# Patient Record
Sex: FEMALE | Race: WHITE | NOT HISPANIC OR LATINO | Employment: FULL TIME | URBAN - METROPOLITAN AREA
[De-identification: names, ages, dates, MRNs, and addresses within clinical notes are randomized per-mention and may not be internally consistent; named-entity substitution may affect disease eponyms.]

---

## 2017-01-11 ENCOUNTER — OFFICE VISIT (OUTPATIENT)
Dept: OBSTETRICS AND GYNECOLOGY | Facility: CLINIC | Age: 47
End: 2017-01-11

## 2017-01-11 VITALS
HEIGHT: 63 IN | DIASTOLIC BLOOD PRESSURE: 70 MMHG | WEIGHT: 175 LBS | SYSTOLIC BLOOD PRESSURE: 110 MMHG | RESPIRATION RATE: 14 BRPM | BODY MASS INDEX: 31.01 KG/M2

## 2017-01-11 DIAGNOSIS — Z01.419 WELL FEMALE EXAM WITH ROUTINE GYNECOLOGICAL EXAM: Primary | ICD-10-CM

## 2017-01-11 PROCEDURE — 99396 PREV VISIT EST AGE 40-64: CPT | Performed by: OBSTETRICS & GYNECOLOGY

## 2017-01-11 NOTE — PROGRESS NOTES
"Chief Complaint: Needs annual exam    Tiffanie Gomez is a 46 y.o.  Ab1 LMP 17 presenting for annual exam.  Patient's periods are now improved-regular without excessive bleeding or cramping.  No gynecologic concerns no urinary or bowel symptoms.  She's had a history of a DVT-not a candidate for hormonal therapy      Pertinent items are noted in HPI.       Visit Vitals   • /70   • Resp 14   • Ht 63\" (160 cm)   • Wt 175 lb (79.4 kg)   • LMP 2017   • BMI 31 kg/m2            Physical Exam    General well developed; well nourished  no acute distress   Skin No suspicious lesions seen   Thyroid normal to inspection and palpation   Lungs breathing is unlabored  clear to auscultation bilaterally   Cardiac regular rate and rhythm, S1, S2 normal, no murmur, click, rub or gallop   Breasts Examined in supine position  Symmetric without masses or skin dimpling  Nipples normal without inversion, lesions or discharge  There are no palpable axillary nodes   Abdomen soft, non-tender; no masses  no umbilical or inginual hernias are present  no hepato-splenomegaly   [facility] Clinical staff was present for exam  External genitalia:  normal appearance of the external genitalia including Bartholin's and [place]'s glands.  :  urethral meatus normal; urethral hypermobility is absent.  Vaginal:  normal pink mucosa without prolapse or lesions.  Cervix:  normal appearance. Pap smear obtained  Uterus:  normal size, shape and consistency.  Adnexa:  normal bimanual exam of the adnexa.  Rectal:  digital rectal exam not performed; anus visually normal appearing.       Assessment:   1. Normal annual exam  2. Periods now regular  3. Condoms for birth control-hormonal options are not possible-history of DVT    Plan:  1. Annual exams    "

## 2017-01-11 NOTE — MR AVS SNAPSHOT
"Tiffanie Gomez   2017 4:00 PM   Office Visit    Dept Phone:  [phone]   Encounter #:  [id number]    Provider:  Claudio Burton MD   Department:  [facility]                Your Full Care Plan              Your Updated Medication List      Notice  As of 2017 11:59 PM    You have not been prescribed any medications.            You Were Diagnosed With        Codes Comments    Well female exam with routine gynecological exam    -  Primary ICD-10-CM: Z01.419  ICD-9-CM: V72.31       Instructions     None    Patient Instructions History      Upcoming Appointments       [employer] Signup     [facility] [employer] allows you to send messages to your doctor, view your test results, renew your prescriptions, schedule appointments, and more. To sign up, go to [employer] and click on the Sign Up Now link in the New User? box. Enter your [employer] Activation Code exactly as it appears below along with the last four digits of your Social Security Number and your Date of Birth () to complete the sign-up process. If you do not sign up before the expiration date, you must request a new code.    [employer] Activation Code: [email or web address]  Expires: 2017  4:34 PM    If you have questions, you can email [email or web address] or call [phone] to talk to our [employer] staff. Remember, [employer] is NOT to be used for urgent needs. For medical emergencies, dial 911.               Other Info from Your Visit           Allergies     Ampicillin  Hives      Reason for Visit     Gynecologic Exam annual      Vital Signs     Blood Pressure Respirations Height Weight Last Menstrual Period Body Mass Index    110/70 14 63\" (160 cm) 175 lb (79.4 kg) 2017 31 kg/m2    Smoking Status                   Never Smoker           Problems and Diagnoses Noted     Well female exam with routine gynecological exam    -  Primary        "

## 2018-04-04 ENCOUNTER — OFFICE VISIT (OUTPATIENT)
Dept: OBSTETRICS AND GYNECOLOGY | Facility: CLINIC | Age: 48
End: 2018-04-04

## 2018-04-04 VITALS
RESPIRATION RATE: 14 BRPM | WEIGHT: 167 LBS | HEIGHT: 63 IN | DIASTOLIC BLOOD PRESSURE: 60 MMHG | BODY MASS INDEX: 29.59 KG/M2 | SYSTOLIC BLOOD PRESSURE: 106 MMHG

## 2018-04-04 DIAGNOSIS — Z12.31 ENCOUNTER FOR SCREENING MAMMOGRAM FOR BREAST CANCER: ICD-10-CM

## 2018-04-04 DIAGNOSIS — Z01.419 WELL WOMAN EXAM WITH ROUTINE GYNECOLOGICAL EXAM: Primary | ICD-10-CM

## 2018-04-04 PROCEDURE — 99396 PREV VISIT EST AGE 40-64: CPT | Performed by: NURSE PRACTITIONER

## 2018-04-04 RX ORDER — CETIRIZINE HYDROCHLORIDE 10 MG/1
10 TABLET ORAL DAILY
COMMUNITY

## 2018-04-04 RX ORDER — NYSTATIN 100000 [USP'U]/G
POWDER TOPICAL 3 TIMES DAILY
Qty: 45 G | Refills: 2 | Status: SHIPPED | OUTPATIENT
Start: 2018-04-04 | End: 2019-01-29
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
## 2018-04-18 ENCOUNTER — HOSPITAL ENCOUNTER (OUTPATIENT)
Dept: MAMMOGRAPHY | Facility: HOSPITAL | Age: 48
Discharge: HOME | End: 2018-04-18
Admitting: NURSE PRACTITIONER

## 2018-04-18 DIAGNOSIS — Z12.31 ENCOUNTER FOR SCREENING MAMMOGRAM FOR BREAST CANCER: ICD-10-CM

## 2018-04-18 PROCEDURE — 77063 BREAST TOMOSYNTHESIS BI: CPT

## 2018-04-18 PROCEDURE — 77063 BREAST TOMOSYNTHESIS BI: CPT | Performed by: RADIOLOGY

## 2018-04-18 PROCEDURE — 77067 SCR MAMMO BI INCL CAD: CPT

## 2018-04-18 PROCEDURE — 77067 SCR MAMMO BI INCL CAD: CPT | Performed by: RADIOLOGY

## 2018-04-24 ENCOUNTER — TELEPHONE (OUTPATIENT)
Dept: OBSTETRICS AND GYNECOLOGY | Facility: CLINIC | Age: 48
End: 2018-04-24

## 2018-04-24 RX ORDER — FLUCONAZOLE 150 MG/1
150 TABLET ORAL
Qty: 2 TABLET | Refills: 0 | Status: SHIPPED | OUTPATIENT
Start: 2018-04-24 | End: 2018-04-28

## 2018-04-24 NOTE — TELEPHONE ENCOUNTER
Pt of Kathleen called stating she is having symptoms of yeast infection. Requesting a RX to be called to [address]. Please advise pt

## 2019-01-28 ENCOUNTER — TELEPHONE (OUTPATIENT)
Dept: OBSTETRICS AND GYNECOLOGY | Facility: CLINIC | Age: 49
End: 2019-01-28

## 2019-01-28 NOTE — TELEPHONE ENCOUNTER
Spoke with pt, she reports finding pea sized lump in groin while showering, denies any pain or discomfort at the site. Scheduled to see Dr Godoy tomorrow.

## 2019-01-29 ENCOUNTER — OFFICE VISIT (OUTPATIENT)
Dept: OBSTETRICS AND GYNECOLOGY | Facility: CLINIC | Age: 49
End: 2019-01-29

## 2019-01-29 VITALS — BODY MASS INDEX: 29.58 KG/M2 | DIASTOLIC BLOOD PRESSURE: 74 MMHG | WEIGHT: 167 LBS | SYSTOLIC BLOOD PRESSURE: 110 MMHG

## 2019-01-29 DIAGNOSIS — R19.09 RIGHT GROIN MASS: Primary | ICD-10-CM

## 2019-01-29 PROCEDURE — 99213 OFFICE O/P EST LOW 20 MIN: CPT | Performed by: OBSTETRICS & GYNECOLOGY

## 2019-01-29 NOTE — PROGRESS NOTES
Subjective   Chief Complaint   Patient presents with   • Gynecologic Exam     Found lump in groin     Tiffanie Gomez is a 48 y.o. year old .  Patient's last menstrual period was 2019 (exact date).  She presents to be seen because of a lump in the right groin area she noticed  evening. She thought at first a hair follicle was infected but it did not look red. She denies any pain. No pruritus. Overall not bothersome.     OTHER THINGS SHE WANTS TO DISCUSS TODAY:  Nothing else    The following portions of the patient's history were reviewed and updated as appropriate:current medications and allergies    Social History    Tobacco Use      Smoking status: Never Smoker      Smokeless tobacco: Never Used    Review of Systems  Constitutional POS: nothing reported    NEG: anorexia or night sweats   Genitourinary POS: nothing reported    NEG: dysuria or hematuria   Gastointestinal POS: nothing reported    NEG: bloating, change in bowel habits, melena or reflux symptoms   Integument POS: nothing reported    NEG: moles that are changing in size, shape, color or rashes   Breast POS: nothing reported    NEG: persistent breast lump, skin dimpling or nipple discharge         Objective   /74 (Patient Position: Sitting)   Wt 75.8 kg (167 lb)   LMP 2019 (Exact Date)   BMI 29.58 kg/m²     General:  well developed; well nourished  no acute distress   Skin:  Not performed.   Thyroid: not examined   Lungs:  breathing is unlabored   Heart:  Not performed.   Breasts:  Not performed.   Abdomen: Not performed.   Pelvis: Not performed.   Extremities: no edema, no calf tenderness, negative jt's bilaterally   Small mobile mass about 0.5cmx0.5cm or smaller in right groin crease across from lower mons area. No fluctuance appreciate or erythema.   Lab Review   No data reviewed    Imaging   No data reviewed        Assessment   1. Right groin mobile mass, possibly right groin lymph node versus small lipoma       Plan   1. Recommended cessation of shaving in the area  2. She will let me know if pain develops or the area grows at all.  3. She will have close follow up in April at her annual exam   4. The importance of keeping all planned follow-up and taking all medications as prescribed was emphasized.  5. Keep already scheduled annual exam in April     No orders of the defined types were placed in this encounter.         This note was electronically signed.    Marizol Godoy MD  January 29, 2019    Note: Speech recognition transcription software may have been used to create portions of this document.  An attempt at proofreading has been made but errors in transcription could still be present.

## 2019-04-05 ENCOUNTER — LAB (OUTPATIENT)
Dept: LAB | Facility: HOSPITAL | Age: 49
End: 2019-04-05

## 2019-04-05 ENCOUNTER — OFFICE VISIT (OUTPATIENT)
Dept: OBSTETRICS AND GYNECOLOGY | Facility: CLINIC | Age: 49
End: 2019-04-05

## 2019-04-05 VITALS
BODY MASS INDEX: 30.05 KG/M2 | WEIGHT: 169.6 LBS | SYSTOLIC BLOOD PRESSURE: 108 MMHG | HEIGHT: 63 IN | DIASTOLIC BLOOD PRESSURE: 72 MMHG

## 2019-04-05 DIAGNOSIS — N92.0 MENORRHAGIA WITH REGULAR CYCLE: ICD-10-CM

## 2019-04-05 DIAGNOSIS — Z01.419 WELL WOMAN EXAM: Primary | ICD-10-CM

## 2019-04-05 DIAGNOSIS — Z30.012 ENCOUNTER FOR PRESCRIPTION OF EMERGENCY CONTRACEPTION: ICD-10-CM

## 2019-04-05 DIAGNOSIS — Z30.09 GENERAL COUNSELING AND ADVICE ON CONTRACEPTIVE MANAGEMENT: ICD-10-CM

## 2019-04-05 LAB
DEPRECATED RDW RBC AUTO: 48.2 FL (ref 37–54)
ERYTHROCYTE [DISTWIDTH] IN BLOOD BY AUTOMATED COUNT: 14.1 % (ref 11.3–14.5)
HCT VFR BLD AUTO: 41.8 % (ref 34.5–44)
HGB BLD-MCNC: 13.7 G/DL (ref 11.5–15.5)
MCH RBC QN AUTO: 30.6 PG (ref 27–31)
MCHC RBC AUTO-ENTMCNC: 32.8 G/DL (ref 32–36)
MCV RBC AUTO: 93.3 FL (ref 80–99)
PLATELET # BLD AUTO: 291 10*3/MM3 (ref 150–450)
PMV BLD AUTO: 10.4 FL (ref 6–12)
RBC # BLD AUTO: 4.48 10*6/MM3 (ref 3.89–5.14)
TSH SERPL DL<=0.05 MIU/L-ACNC: 3.27 MIU/ML (ref 0.35–5.35)
WBC NRBC COR # BLD: 9.71 10*3/MM3 (ref 3.5–10.8)

## 2019-04-05 PROCEDURE — 99396 PREV VISIT EST AGE 40-64: CPT | Performed by: OBSTETRICS & GYNECOLOGY

## 2019-04-05 PROCEDURE — 84443 ASSAY THYROID STIM HORMONE: CPT

## 2019-04-05 PROCEDURE — 36415 COLL VENOUS BLD VENIPUNCTURE: CPT | Performed by: OBSTETRICS & GYNECOLOGY

## 2019-04-05 PROCEDURE — 85027 COMPLETE CBC AUTOMATED: CPT | Performed by: OBSTETRICS & GYNECOLOGY

## 2019-04-05 NOTE — PROGRESS NOTES
Subjective   Chief Complaint   Patient presents with   • Gynecologic Exam     pt here for annual. last pap 18 negative, mammo 18 negative. pt interested in birth control. did have a blood clot. pt interested in morning after pill.     Tiffanie Gomez is a 48 y.o. year old  presenting to be seen for her annual exam.     SEXUAL Hx:  She is currently sexually active.  In the past year there there has been ONE new sexual partner.    Condoms are never used.  She would not like to be screened for STD's at today's exam.  Current birth control method: not using any form of contraception and does not wish to get pregnant.  She is not happy with her current method of contraception and does want to discuss alternative methods of contraception.  MENSTRUAL Hx:  Patient's last menstrual period was 2019 (exact date).  In the past 6 months her cycles have been regular, predictable and occur monthly.  Her menstrual flow is light to heavy depending on the month with some more clotting this year.   Each month on average there are roughly 3 day(s) of very heavy flow.    Duration 5-6 days   Intermenstrual bleeding is one time around last month near ovulation .    Post-coital bleeding is absent.  Dysmenorrhea: mild and is not affecting her activities of daily living  PMS: mild and is not affecting her activities of daily living  Her cycles are not a source of concern for her that she wishes to discuss today.  HEALTH Hx:  She exercises regularly: no (but is planning to start exercising more ).  She wears her seat belt: yes.  She has concerns about domestic violence: no.  OTHER THINGS SHE WANTS TO DISCUSS TODAY:  Reports she had intercourse yesterday around time of ovulation and she has questions about the morning after pill.  Do not use a condom.  She also reports issues with getting some irritation on the vulvar area when she is using her pads with periods.    The following portions of the patient's history were  "reviewed and updated as appropriate:problem list, current medications, allergies, past family history, past medical history, past social history and past surgical history.    Social History    Tobacco Use      Smoking status: Never Smoker      Smokeless tobacco: Never Used    Review of Systems  Constitutional POS: nothing reported    NEG: anorexia or night sweats   Genitourinary POS: both stress and urge incontinence are  present but it IS NOT effecting her ADL's    NEG: dysuria or hematuria      Gastointestinal POS: nothing reported    NEG: bloating, change in bowel habits, melena or reflux symptoms   Integument POS: nothing reported    NEG: moles that are changing in size, shape, color or rashes   Breast POS: nothing reported    NEG: persistent breast lump, skin dimpling or nipple discharge        Objective   /72   Ht 160 cm (63\")   Wt 76.9 kg (169 lb 9.6 oz)   LMP 03/22/2019 (Exact Date)   Breastfeeding? No   BMI 30.04 kg/m²     General:  well developed; well nourished  no acute distress   Skin:  No suspicious lesions seen   Thyroid: normal to inspection and palpation   Breasts:  Examined in supine position  Symmetric without masses or skin dimpling  Nipples normal without inversion, lesions or discharge  There are no palpable axillary nodes   Abdomen: soft, non-tender; no masses  no umbilical or inguinal hernias are present  no hepato-splenomegaly   Pelvis: Clinical staff was present for exam  External genitalia:  normal appearance of the external genitalia including Bartholin's and [place]'s glands.  :  urethral meatus normal;  Vaginal:  normal pink mucosa without prolapse or lesions.  Cervix:  normal appearance.  Uterus:  normal size, shape and consistency.  Adnexa:  normal bimanual exam of the adnexa.  Rectal:  digital rectal exam not performed; anus visually normal appearing.        Assessment   1. Normal GYN exam  2. Contraceptive counseling  3. Emergency contraceptive counseling  4. Heavy " menstrual bleeding with regular cycle.      Plan   1. Pap was done today per patient request even though it has been less then 3 years since her last Pap smear.  If she does not receive the results of the Pap within 2 weeks  time, she was instructed to call to find out the results.  I explained to Tiffanie that the recommendations for Pap smear interval in a low risk patient's has lengthened to 3 years time.  I encouraged her to be seen yearly for a full physical exam including breast and pelvic exam even during the off years when PAP's will not be performed.  2. Reviewed options for long-acting reversible contraception and options that may help with heavy menstrual bleeding.  At this time she is not interested in IUD and is considering a bilateral tubal ligation.  She will let me know if she decides to do this.  3. Pelvic ultrasound ordered to evaluate new heavy menstrual bleeding over the past year to year and a half.  4. The following tests were ordered today: CBC w/o differential and TSH.  It was explained to Tiffanie that all lab test should be back within the one week after they are performed. She will be notified about the results, regardless of the findings. If she has not been contacted by the office within 2 weeks after the test has been performed, it is her responsibility to contact us to learn about her results.  5. Viewed options for emergency contraception including ParaGard IUD, Phyllis, progesterone pill.  She desires to do Phyllis.  Prescription sent to pharmacy I did confirm that pharmacy has this medication in stock.  6. The importance of keeping all planned follow-up and taking all medications as prescribed was emphasized  7. Follow up after ultrasound in 2-3 weeks.   No orders of the defined types were placed in this encounter.         This note was electronically signed.    Marizol Godoy MD  April 5, 2019    Note: Speech recognition transcription software may have been used to create portions of this  document.  An attempt at proofreading has been made but errors in transcription could still be present.

## 2019-05-07 ENCOUNTER — OFFICE VISIT (OUTPATIENT)
Dept: OBSTETRICS AND GYNECOLOGY | Facility: CLINIC | Age: 49
End: 2019-05-07

## 2019-05-07 VITALS
DIASTOLIC BLOOD PRESSURE: 74 MMHG | HEIGHT: 63 IN | BODY MASS INDEX: 30.65 KG/M2 | SYSTOLIC BLOOD PRESSURE: 110 MMHG | WEIGHT: 173 LBS

## 2019-05-07 DIAGNOSIS — N92.0 MENORRHAGIA WITH REGULAR CYCLE: Primary | ICD-10-CM

## 2019-05-07 PROCEDURE — 99213 OFFICE O/P EST LOW 20 MIN: CPT | Performed by: OBSTETRICS & GYNECOLOGY

## 2019-05-07 NOTE — PROGRESS NOTES
"Subjective   Chief Complaint   Patient presents with   • Follow-up     US here today for menorrhagia wih regular cycle.     Tiffanie Gomez is a 49 y.o. year old  who comes to review her recent testing and discuss next steps. Reports she was able to  Phyllis after our last visit. She reports her new partner has voiced he desires to get vasectomy but this has not been performed yet. She does not desires permanent sterilization at this time. She also is not bothered by the slightly heavier bleeding and does not desire to start hormonal medications. She reports 1-2 times around ovulation she noticed some mucous discharge with a \"pinpoint pink spot in it\". She denies any post coital bleeding. Not currently using contraception other than timing of intercourse.     OTHER THINGS SHE WANTS TO DISCUSS TODAY:  Nothing else       Objective   /74   Ht 160 cm (63\")   Wt 78.5 kg (173 lb)   LMP 2019 (Exact Date)   Breastfeeding? No   BMI 30.65 kg/m²     Lab Review   No data reviewed    Imaging   Pelvic ultrasound images independantly reviewed - Very liminted exam. Best EMS was 6.8mm       Assessment   1. Slightly heavier menstrual bleeding with regular cycle  2. Contraception counseling     Plan   1. Reviewed with patient very limited ultrasound. Given bleeding has only slightly increased in past year this is most likely consistent with perimenopausal transition. However, reviewed importance with patient of letting me know if bleeding were to increase or if intermenstrual or post coital bleeding developed and we would do further work up with endometrial biopsy versus diagnostic hysteroscopy.   2. Reviewed contraception options reviewing avoidance of estrogen containing products given history of DVT in the past. She declines at this time.   3. The importance of keeping all planned follow-up and taking all medications as prescribed was emphasized.  4. Follow up for annual exam in one year    No orders of " the defined types were placed in this encounter.         Total time spent today with Tiffanie  was 20 minutes (level 3).  Greater than 50% of the time was spent face-to-face coordinating care, answering her questions and counseling regarding pathophysiology of her presenting problem along with plans for any diagnositc work-up and treatment.    This note was electronically signed.    Marizol Godoy MD  May 7, 2019    Note: Speech recognition transcription software may have been used to create portions of this document.  An attempt at proofreading has been made but errors in transcription could still be present.

## 2019-05-08 ENCOUNTER — TELEPHONE (OUTPATIENT)
Dept: OBSTETRICS AND GYNECOLOGY | Facility: CLINIC | Age: 49
End: 2019-05-08

## 2019-05-08 NOTE — TELEPHONE ENCOUNTER
Outpatient to discuss recent ultrasound.  No answer no voicemail available.  Call patient back tomorrow.  Recommendation will be to do saline infusion ultrasound given limited exam with endometrial stripe at 6.8 mm with history of increased regular bleeding and age greater than 45.  Marizol Godoy MD

## 2019-05-09 ENCOUNTER — TELEPHONE (OUTPATIENT)
Dept: OBSTETRICS AND GYNECOLOGY | Facility: CLINIC | Age: 49
End: 2019-05-09

## 2019-05-09 DIAGNOSIS — N92.0 MENORRHAGIA WITH REGULAR CYCLE: Primary | ICD-10-CM

## 2019-05-09 NOTE — TELEPHONE ENCOUNTER
Called Patient, scheduled SIS on 5/30/19 at 11am (Patient scheduled with Dr Martinez and Dr Godoy as well)    Lizabeth Lira

## 2019-05-09 NOTE — TELEPHONE ENCOUNTER
Called patient to review ultrasound results. I recommend after review of final images and report that we proceed with SIS in order to further evaluate the endometrium given she >45 years old and has had increase in flow of bleeding with clots. Patient is willing to proceed. She desires the week of the 27th if possible. Can we get her set up for SIS that week if available. It needs to be put on mine and Dr. Martinez's schedule. Order placed for SIS.     Thanks,   Marizol Godoy MD

## 2019-05-10 ENCOUNTER — TRANSCRIBE ORDERS (OUTPATIENT)
Dept: ADMINISTRATIVE | Facility: HOSPITAL | Age: 49
End: 2019-05-10

## 2019-05-10 DIAGNOSIS — Z12.31 VISIT FOR SCREENING MAMMOGRAM: Primary | ICD-10-CM

## 2019-05-13 ENCOUNTER — HOSPITAL ENCOUNTER (OUTPATIENT)
Dept: MAMMOGRAPHY | Facility: HOSPITAL | Age: 49
Discharge: HOME | End: 2019-05-13
Admitting: OBSTETRICS & GYNECOLOGY

## 2019-05-13 DIAGNOSIS — Z12.31 VISIT FOR SCREENING MAMMOGRAM: ICD-10-CM

## 2019-05-13 PROCEDURE — 77063 BREAST TOMOSYNTHESIS BI: CPT | Performed by: RADIOLOGY

## 2019-05-13 PROCEDURE — 77063 BREAST TOMOSYNTHESIS BI: CPT

## 2019-05-13 PROCEDURE — 77067 SCR MAMMO BI INCL CAD: CPT | Performed by: RADIOLOGY

## 2019-05-13 PROCEDURE — 77067 SCR MAMMO BI INCL CAD: CPT

## 2019-05-30 ENCOUNTER — OFFICE VISIT (OUTPATIENT)
Dept: OBSTETRICS AND GYNECOLOGY | Facility: CLINIC | Age: 49
End: 2019-05-30

## 2019-05-30 DIAGNOSIS — N92.0 MENORRHAGIA WITH REGULAR CYCLE: Primary | ICD-10-CM

## 2019-05-30 PROCEDURE — 99212-NC PR NO CHARGE CBC OFFICE OUTPATIENT VISIT 10 MINUTES: Performed by: OBSTETRICS & GYNECOLOGY

## 2019-05-30 PROCEDURE — 58340 CATHETER FOR HYSTEROGRAPHY: CPT | Performed by: OBSTETRICS & GYNECOLOGY

## 2019-05-30 NOTE — PROGRESS NOTES
Saline Infusion Sonogram     Date of procedure:  May 30, 2019     Risks and benefits were discussed.  All questions were answered.  Consents given by the patient verbally.     Pre-op indication:  1. Menorrhagia     Procedure documentation:    After the patient was identified and informed consent given she was placed in dorsal lithotomy position in stirrups and draped. A sterile speculum was placed inside the vagina with good visualization of the cervix and the cervix was cleaned with Betadine swabs.  The cervix was grasped with a single-tooth tenaculum.  A balloon catheter was introduced through the cervix without complication and inflated. The speculum was removed. The uterine cavity was then evaluated with transvaginal ultrasonography while saline was being instilled.    The findings were as follows:  · The bilayer endometrial stripe was hard to measure due to scanning artifact through her prior  scar  · Focal lesions were absent    The balloon was then released and the cavity was then drained of saline and the catheter was removed. Scant bleeding was noted from the cervical lip and the procedure was completed. The patient tolerated the procedure well and was given follow-up instructions.      Impression: 1. Normal cavity without focal findings.  The endometrium is hard to see due to scanning artifact.  Endometrial neoplasia cannot be excluded   Plan: 1. Per Dr. Godoy     This note was electronically signed.    Malcolm Martinez M.D.  May 30, 2019

## 2019-06-05 ENCOUNTER — OFFICE VISIT (OUTPATIENT)
Dept: OBSTETRICS AND GYNECOLOGY | Facility: CLINIC | Age: 49
End: 2019-06-05

## 2019-06-05 ENCOUNTER — TELEPHONE (OUTPATIENT)
Dept: OBSTETRICS AND GYNECOLOGY | Facility: CLINIC | Age: 49
End: 2019-06-05

## 2019-06-05 VITALS
WEIGHT: 173 LBS | HEIGHT: 63 IN | DIASTOLIC BLOOD PRESSURE: 72 MMHG | BODY MASS INDEX: 30.65 KG/M2 | SYSTOLIC BLOOD PRESSURE: 112 MMHG

## 2019-06-05 DIAGNOSIS — Z13.9 SPECIAL SCREENING: ICD-10-CM

## 2019-06-05 DIAGNOSIS — N92.0 MENORRHAGIA WITH REGULAR CYCLE: Primary | ICD-10-CM

## 2019-06-05 LAB
B-HCG UR QL: NEGATIVE
INTERNAL NEGATIVE CONTROL: NEGATIVE
INTERNAL POSITIVE CONTROL: POSITIVE
Lab: NORMAL

## 2019-06-05 PROCEDURE — 58100 BIOPSY OF UTERUS LINING: CPT | Performed by: OBSTETRICS & GYNECOLOGY

## 2019-06-05 PROCEDURE — 81025 URINE PREGNANCY TEST: CPT | Performed by: OBSTETRICS & GYNECOLOGY

## 2019-06-05 NOTE — TELEPHONE ENCOUNTER
Called patient to review of saline infusion ultrasound results. Reviewed that overall the ultrasound was normal but there were no focal findings present.  However, the exam was limited to shadowing from the  scar. Even the limited exam and the thickened endometrium on transvaginal ultrasound with increased bleeding over the past 1 to 2 years and age greater than 45 I recommended that she have an endometrial biopsy today.  She is okay with having that done today at her already scheduled appointment.    Marizol Godoy MD

## 2019-06-05 NOTE — PROGRESS NOTES
Endometrial Biopsy    Date of procedure:  6/5/2019    Procedure documentation:    The cervix was grasped anterior at the 12 o'clock position.  The cavity sounded to 9.5 centimeters.  An endometrial biopsy specimen was obtained and multiple passes (x3).  The tissue was sent for permanent histopathologic evaluation.  Tenaculum was removed from the cervix with scant bleeding.    Post procedure instructions: She was instructed to call us in 1 week's time if she has not heard from us otherwise.  If there is any significant fever or excessive bleeding or pain she is to call immediately.    Will call with results.     This note was electronically signed.    Marizol Godoy MD  June 5, 2019

## 2020-04-28 ENCOUNTER — TRANSCRIBE ORDERS (OUTPATIENT)
Dept: ADMINISTRATIVE | Facility: HOSPITAL | Age: 50
End: 2020-04-28

## 2020-04-28 DIAGNOSIS — Z12.31 VISIT FOR SCREENING MAMMOGRAM: Primary | ICD-10-CM

## 2020-08-04 ENCOUNTER — HOSPITAL ENCOUNTER (OUTPATIENT)
Dept: MAMMOGRAPHY | Facility: HOSPITAL | Age: 50
Discharge: HOME | End: 2020-08-04
Admitting: OBSTETRICS & GYNECOLOGY

## 2020-08-04 ENCOUNTER — OFFICE VISIT (OUTPATIENT)
Dept: OBSTETRICS AND GYNECOLOGY | Facility: CLINIC | Age: 50
End: 2020-08-04

## 2020-08-04 VITALS
TEMPERATURE: 97.8 F | SYSTOLIC BLOOD PRESSURE: 116 MMHG | WEIGHT: 175.2 LBS | HEIGHT: 63 IN | RESPIRATION RATE: 20 BRPM | OXYGEN SATURATION: 97 % | DIASTOLIC BLOOD PRESSURE: 74 MMHG | HEART RATE: 82 BPM | BODY MASS INDEX: 31.04 KG/M2

## 2020-08-04 DIAGNOSIS — Z12.31 VISIT FOR SCREENING MAMMOGRAM: ICD-10-CM

## 2020-08-04 DIAGNOSIS — Z01.419 WELL WOMAN EXAM: Primary | ICD-10-CM

## 2020-08-04 PROBLEM — N92.0 MENORRHAGIA WITH REGULAR CYCLE: Status: RESOLVED | Noted: 2019-06-05 | Resolved: 2020-08-04

## 2020-08-04 PROCEDURE — 99396 PREV VISIT EST AGE 40-64: CPT | Performed by: OBSTETRICS & GYNECOLOGY

## 2020-08-04 PROCEDURE — 77063 BREAST TOMOSYNTHESIS BI: CPT

## 2020-08-04 PROCEDURE — 77067 SCR MAMMO BI INCL CAD: CPT

## 2020-08-04 PROCEDURE — 77067 SCR MAMMO BI INCL CAD: CPT | Performed by: RADIOLOGY

## 2020-08-04 PROCEDURE — 77063 BREAST TOMOSYNTHESIS BI: CPT | Performed by: RADIOLOGY

## 2020-08-04 NOTE — PROGRESS NOTES
Subjective   Chief Complaint   Patient presents with   • Gynecologic Exam     Tiffanie Gomez is a 50 y.o. year old  presenting to be seen for her annual exam.     SEXUAL Hx:  She is currently sexually active.  In the past year there there has been NO new sexual partners.    Condoms are never used.  She would not like to be screened for STD's at today's exam.  Current birth control method: not using any form of contraception and does not wish to get pregnant.  She is happy with her current method of contraception and does not want to discuss alternative methods of contraception.  MENSTRUAL Hx:  LMP 2020  In the past 6 months her cycles have been regular, predictable and occur monthly.  Her menstrual flow is typically normal.   Each month on average there are roughly 0 day(s) of very heavy flow.    Intermenstrual bleeding is absent.    Post-coital bleeding is absent.  Dysmenorrhea: none  PMS: none  Her cycles are not a source of concern for her that she wishes to discuss today.  HEALTH Hx:  She exercises regularly: yes.  She wears her seat belt: yes.  She has concerns about domestic violence: no.  OTHER THINGS SHE WANTS TO DISCUSS TODAY:  Nothing else    The following portions of the patient's history were reviewed and updated as appropriate:problem list, current medications, allergies, past family history, past medical history, past social history and past surgical history.    Social History    Tobacco Use      Smoking status: Never Smoker      Smokeless tobacco: Never Used    Review of Systems  Constitutional POS: nothing reported    NEG: anorexia or night sweats   Genitourinary POS: REI no affecting ADLs    NEG: dysuria or hematuria      Gastointestinal POS: nothing reported    NEG: bloating, change in bowel habits, melena or reflux symptoms   Integument POS: nothing reported    NEG: moles that are changing in size, shape, color or rashes   Breast POS: nothing reported    NEG: persistent breast lump,  "skin dimpling or nipple discharge        Objective   /74   Pulse 82   Temp 97.8 °F (36.6 °C) (Temporal)   Resp 20   Ht 160 cm (63\")   Wt 79.5 kg (175 lb 3.2 oz)   SpO2 97%   BMI 31.04 kg/m²     General:  well developed; well nourished  no acute distress   Skin:  No suspicious lesions seen   Thyroid: normal to inspection and palpation   Breasts:  Examined in supine position  Symmetric without masses or skin dimpling  Nipples normal without inversion, lesions or discharge  There are no palpable axillary nodes   Abdomen: soft, non-tender; no masses  no umbilical or inguinal hernias are present  no hepato-splenomegaly   Pelvis: Clinical staff was present for exam  External genitalia:  normal appearance of the external genitalia including Bartholin's and Chinle's glands.  :  urethral meatus normal;  Vaginal:  normal pink mucosa without prolapse or lesions.  Cervix:  normal appearance.  Uterus:  normal size, shape and consistency.  Adnexa:  normal bimanual exam of the adnexa.  Rectal:  digital rectal exam not performed; anus visually normal appearing.        Assessment   1. Normal GYN exam       Plan   Pap was not done today.  I explained to Tiffanie that the recommendations for Pap smear interval in a low risk patient has lengthened to 3 years time.  I told Tiffanie she still needs to be seen in our office yearly for a full physical including breast and pelvic exam.  She was encouraged to get yearly mammograms.  She should report any palpable breast lump(s) or skin changes regardless of mammographic findings.  I explained to Tiffanie that notification regarding her mammogram results will come from the center performing the study.  Our office will not be routinely calling with mammogram results.  It is her responsibility to make sure that the results from the mammogram are communicated to her by the breast center.  If she has any questions about the results, she is welcome to call our office anytime  No prescription was " given or electronically sent at today's visit  The importance of keeping all planned follow-up and taking all medications as prescribed was emphasized.  Follow up for annual exam in one year    No orders of the defined types were placed in this encounter.         This note was electronically signed.    Marizol Godoy MD  August 4, 2020    Note: Speech recognition transcription software may have been used to create portions of this document.  An attempt at proofreading has been made but errors in transcription could still be present.

## 2021-01-09 ENCOUNTER — ANESTHESIA (OUTPATIENT)
Dept: PERIOP | Facility: HOSPITAL | Age: 51
End: 2021-01-09

## 2021-01-09 ENCOUNTER — APPOINTMENT (OUTPATIENT)
Dept: CT IMAGING | Facility: HOSPITAL | Age: 51
End: 2021-01-09

## 2021-01-09 ENCOUNTER — ANESTHESIA EVENT (OUTPATIENT)
Dept: PERIOP | Facility: HOSPITAL | Age: 51
End: 2021-01-09

## 2021-01-09 ENCOUNTER — APPOINTMENT (OUTPATIENT)
Dept: ULTRASOUND IMAGING | Facility: HOSPITAL | Age: 51
End: 2021-01-09

## 2021-01-09 ENCOUNTER — APPOINTMENT (OUTPATIENT)
Dept: GENERAL RADIOLOGY | Facility: HOSPITAL | Age: 51
End: 2021-01-09

## 2021-01-09 ENCOUNTER — HOSPITAL ENCOUNTER (INPATIENT)
Facility: HOSPITAL | Age: 51
LOS: 1 days | Discharge: HOME | End: 2021-01-10
Attending: EMERGENCY MEDICINE | Admitting: HOSPITALIST

## 2021-01-09 DIAGNOSIS — K80.00 ACUTE CALCULOUS CHOLECYSTITIS: Primary | ICD-10-CM

## 2021-01-09 DIAGNOSIS — E86.0 DEHYDRATION: ICD-10-CM

## 2021-01-09 PROBLEM — Z63.8 POOR ATTACHMENT TO PRIMARY CAREGIVER: Status: ACTIVE | Noted: 2021-01-09

## 2021-01-09 PROBLEM — I95.9 HYPOTENSION: Status: ACTIVE | Noted: 2021-01-09

## 2021-01-09 PROBLEM — E66.9 OBESITY (BMI 30-39.9): Status: ACTIVE | Noted: 2021-01-09

## 2021-01-09 LAB
ALBUMIN SERPL-MCNC: 4 G/DL (ref 3.5–5.2)
ALBUMIN/GLOB SERPL: 1.3 G/DL
ALP SERPL-CCNC: 61 U/L (ref 39–117)
ALT SERPL W P-5'-P-CCNC: 9 U/L (ref 1–33)
ANION GAP SERPL CALCULATED.3IONS-SCNC: 12 MMOL/L (ref 5–15)
AST SERPL-CCNC: 13 U/L (ref 1–32)
B PARAPERT DNA SPEC QL NAA+PROBE: NOT DETECTED
B PERT DNA SPEC QL NAA+PROBE: NOT DETECTED
B-HCG UR QL: NEGATIVE
BASOPHILS # BLD AUTO: 0.06 10*3/MM3 (ref 0–0.2)
BASOPHILS NFR BLD AUTO: 0.5 % (ref 0–1.5)
BILIRUB SERPL-MCNC: 0.5 MG/DL (ref 0–1.2)
BUN SERPL-MCNC: 13 MG/DL (ref 6–20)
BUN/CREAT SERPL: 15.9 (ref 7–25)
C PNEUM DNA NPH QL NAA+NON-PROBE: NOT DETECTED
CALCIUM SPEC-SCNC: 9.1 MG/DL (ref 8.6–10.5)
CHLORIDE SERPL-SCNC: 105 MMOL/L (ref 98–107)
CO2 SERPL-SCNC: 24 MMOL/L (ref 22–29)
CREAT SERPL-MCNC: 0.82 MG/DL (ref 0.57–1)
DEPRECATED RDW RBC AUTO: 46.1 FL (ref 37–54)
EOSINOPHIL # BLD AUTO: 0.1 10*3/MM3 (ref 0–0.4)
EOSINOPHIL NFR BLD AUTO: 0.9 % (ref 0.3–6.2)
ERYTHROCYTE [DISTWIDTH] IN BLOOD BY AUTOMATED COUNT: 13.2 % (ref 12.3–15.4)
FLUAV H1 2009 PAND RNA NPH QL NAA+PROBE: NOT DETECTED
FLUAV H1 HA GENE NPH QL NAA+PROBE: NOT DETECTED
FLUAV H3 RNA NPH QL NAA+PROBE: NOT DETECTED
FLUAV SUBTYP SPEC NAA+PROBE: NOT DETECTED
FLUBV RNA ISLT QL NAA+PROBE: NOT DETECTED
GFR SERPL CREATININE-BSD FRML MDRD: 74 ML/MIN/1.73
GLOBULIN UR ELPH-MCNC: 3.1 GM/DL
GLUCOSE SERPL-MCNC: 125 MG/DL (ref 65–99)
HADV DNA SPEC NAA+PROBE: NOT DETECTED
HCOV 229E RNA SPEC QL NAA+PROBE: NOT DETECTED
HCOV HKU1 RNA SPEC QL NAA+PROBE: NOT DETECTED
HCOV NL63 RNA SPEC QL NAA+PROBE: NOT DETECTED
HCOV OC43 RNA SPEC QL NAA+PROBE: NOT DETECTED
HCT VFR BLD AUTO: 43.2 % (ref 34–46.6)
HGB BLD-MCNC: 13.5 G/DL (ref 12–15.9)
HMPV RNA NPH QL NAA+NON-PROBE: NOT DETECTED
HOLD SPECIMEN: NORMAL
HOLD SPECIMEN: NORMAL
HPIV1 RNA SPEC QL NAA+PROBE: NOT DETECTED
HPIV2 RNA SPEC QL NAA+PROBE: NOT DETECTED
HPIV3 RNA NPH QL NAA+PROBE: NOT DETECTED
HPIV4 P GENE NPH QL NAA+PROBE: NOT DETECTED
IMM GRANULOCYTES # BLD AUTO: 0.03 10*3/MM3 (ref 0–0.05)
IMM GRANULOCYTES NFR BLD AUTO: 0.3 % (ref 0–0.5)
INTERNAL NEGATIVE CONTROL: NEGATIVE
INTERNAL POSITIVE CONTROL: POSITIVE
LIPASE SERPL-CCNC: 35 U/L (ref 13–60)
LYMPHOCYTES # BLD AUTO: 2.35 10*3/MM3 (ref 0.7–3.1)
LYMPHOCYTES NFR BLD AUTO: 20 % (ref 19.6–45.3)
Lab: NORMAL
M PNEUMO IGG SER IA-ACNC: NOT DETECTED
MCH RBC QN AUTO: 29.7 PG (ref 26.6–33)
MCHC RBC AUTO-ENTMCNC: 31.3 G/DL (ref 31.5–35.7)
MCV RBC AUTO: 94.9 FL (ref 79–97)
MONOCYTES # BLD AUTO: 0.76 10*3/MM3 (ref 0.1–0.9)
MONOCYTES NFR BLD AUTO: 6.5 % (ref 5–12)
NEUTROPHILS NFR BLD AUTO: 71.8 % (ref 42.7–76)
NEUTROPHILS NFR BLD AUTO: 8.44 10*3/MM3 (ref 1.7–7)
NRBC BLD AUTO-RTO: 0 /100 WBC (ref 0–0.2)
NT-PROBNP SERPL-MCNC: 54.5 PG/ML (ref 0–450)
PLATELET # BLD AUTO: 253 10*3/MM3 (ref 140–450)
PMV BLD AUTO: 10.1 FL (ref 6–12)
POTASSIUM SERPL-SCNC: 3.7 MMOL/L (ref 3.5–5.2)
PROT SERPL-MCNC: 7.1 G/DL (ref 6–8.5)
QT INTERVAL: 386 MS
QT INTERVAL: 396 MS
QTC INTERVAL: 413 MS
QTC INTERVAL: 421 MS
RBC # BLD AUTO: 4.55 10*6/MM3 (ref 3.77–5.28)
RHINOVIRUS RNA SPEC NAA+PROBE: NOT DETECTED
RSV RNA NPH QL NAA+NON-PROBE: NOT DETECTED
SARS-COV-2 RNA NPH QL NAA+NON-PROBE: NOT DETECTED
SODIUM SERPL-SCNC: 141 MMOL/L (ref 136–145)
TROPONIN T SERPL-MCNC: <0.01 NG/ML (ref 0–0.03)
TROPONIN T SERPL-MCNC: <0.01 NG/ML (ref 0–0.03)
WBC # BLD AUTO: 11.74 10*3/MM3 (ref 3.4–10.8)
WHOLE BLOOD HOLD SPECIMEN: NORMAL
WHOLE BLOOD HOLD SPECIMEN: NORMAL

## 2021-01-09 PROCEDURE — 0 IOPAMIDOL PER 1 ML: Performed by: EMERGENCY MEDICINE

## 2021-01-09 PROCEDURE — 0FT44ZZ RESECTION OF GALLBLADDER, PERCUTANEOUS ENDOSCOPIC APPROACH: ICD-10-PCS | Performed by: SURGERY

## 2021-01-09 PROCEDURE — 81025 URINE PREGNANCY TEST: CPT | Performed by: EMERGENCY MEDICINE

## 2021-01-09 PROCEDURE — 25010000002 DEXAMETHASONE PER 1 MG: Performed by: NURSE ANESTHETIST, CERTIFIED REGISTERED

## 2021-01-09 PROCEDURE — 71275 CT ANGIOGRAPHY CHEST: CPT

## 2021-01-09 PROCEDURE — 83690 ASSAY OF LIPASE: CPT | Performed by: EMERGENCY MEDICINE

## 2021-01-09 PROCEDURE — 25010000002 MIDAZOLAM PER 1 MG: Performed by: NURSE ANESTHETIST, CERTIFIED REGISTERED

## 2021-01-09 PROCEDURE — 25010000002 PROPOFOL 10 MG/ML EMULSION: Performed by: NURSE ANESTHETIST, CERTIFIED REGISTERED

## 2021-01-09 PROCEDURE — 25010000002 FENTANYL CITRATE (PF) 100 MCG/2ML SOLUTION: Performed by: NURSE ANESTHETIST, CERTIFIED REGISTERED

## 2021-01-09 PROCEDURE — 25010000002 ONDANSETRON PER 1 MG: Performed by: NURSE ANESTHETIST, CERTIFIED REGISTERED

## 2021-01-09 PROCEDURE — 25010000002 HYDROMORPHONE PER 4 MG: Performed by: NURSE ANESTHETIST, CERTIFIED REGISTERED

## 2021-01-09 PROCEDURE — 88304 TISSUE EXAM BY PATHOLOGIST: CPT | Performed by: SURGERY

## 2021-01-09 PROCEDURE — 74177 CT ABD & PELVIS W/CONTRAST: CPT

## 2021-01-09 PROCEDURE — 99285 EMERGENCY DEPT VISIT HI MDM: CPT

## 2021-01-09 PROCEDURE — 76705 ECHO EXAM OF ABDOMEN: CPT

## 2021-01-09 PROCEDURE — 93005 ELECTROCARDIOGRAM TRACING: CPT | Performed by: EMERGENCY MEDICINE

## 2021-01-09 PROCEDURE — 25010000002 NEOSTIGMINE 10 MG/10ML SOLUTION: Performed by: NURSE ANESTHETIST, CERTIFIED REGISTERED

## 2021-01-09 PROCEDURE — 25010000002 CEFTRIAXONE PER 250 MG: Performed by: EMERGENCY MEDICINE

## 2021-01-09 PROCEDURE — 0202U NFCT DS 22 TRGT SARS-COV-2: CPT | Performed by: FAMILY MEDICINE

## 2021-01-09 PROCEDURE — 85025 COMPLETE CBC W/AUTO DIFF WBC: CPT | Performed by: EMERGENCY MEDICINE

## 2021-01-09 PROCEDURE — 84484 ASSAY OF TROPONIN QUANT: CPT | Performed by: EMERGENCY MEDICINE

## 2021-01-09 PROCEDURE — 99223 1ST HOSP IP/OBS HIGH 75: CPT | Performed by: HOSPITALIST

## 2021-01-09 PROCEDURE — 80053 COMPREHEN METABOLIC PANEL: CPT | Performed by: EMERGENCY MEDICINE

## 2021-01-09 PROCEDURE — 83880 ASSAY OF NATRIURETIC PEPTIDE: CPT | Performed by: EMERGENCY MEDICINE

## 2021-01-09 PROCEDURE — 71045 X-RAY EXAM CHEST 1 VIEW: CPT

## 2021-01-09 DEVICE — LIGACLIP 10-M/L, 10MM ENDOSCOPIC ROTATING MULTIPLE CLIP APPLIERS
Type: IMPLANTABLE DEVICE | Site: ABDOMEN | Status: FUNCTIONAL
Brand: LIGACLIP

## 2021-01-09 RX ORDER — ACETAMINOPHEN 650 MG/1
650 SUPPOSITORY RECTAL EVERY 4 HOURS PRN
Status: DISCONTINUED | OUTPATIENT
Start: 2021-01-09 | End: 2021-01-09

## 2021-01-09 RX ORDER — HYDROMORPHONE HYDROCHLORIDE 1 MG/ML
0.5 INJECTION, SOLUTION INTRAMUSCULAR; INTRAVENOUS; SUBCUTANEOUS
Status: DISCONTINUED | OUTPATIENT
Start: 2021-01-09 | End: 2021-01-10 | Stop reason: HOSPADM

## 2021-01-09 RX ORDER — SODIUM CHLORIDE 0.9 % (FLUSH) 0.9 %
10 SYRINGE (ML) INJECTION AS NEEDED
Status: DISCONTINUED | OUTPATIENT
Start: 2021-01-09 | End: 2021-01-10 | Stop reason: HOSPADM

## 2021-01-09 RX ORDER — SODIUM CHLORIDE 0.9 % (FLUSH) 0.9 %
10 SYRINGE (ML) INJECTION EVERY 12 HOURS SCHEDULED
Status: DISCONTINUED | OUTPATIENT
Start: 2021-01-09 | End: 2021-01-10 | Stop reason: HOSPADM

## 2021-01-09 RX ORDER — SODIUM CHLORIDE 0.9 % (FLUSH) 0.9 %
3-10 SYRINGE (ML) INJECTION AS NEEDED
Status: DISCONTINUED | OUTPATIENT
Start: 2021-01-09 | End: 2021-01-09 | Stop reason: HOSPADM

## 2021-01-09 RX ORDER — MEPERIDINE HYDROCHLORIDE 25 MG/ML
12.5 INJECTION INTRAMUSCULAR; INTRAVENOUS; SUBCUTANEOUS
Status: DISCONTINUED | OUTPATIENT
Start: 2021-01-09 | End: 2021-01-09 | Stop reason: HOSPADM

## 2021-01-09 RX ORDER — ACETAMINOPHEN 325 MG/1
650 TABLET ORAL EVERY 4 HOURS PRN
Status: DISCONTINUED | OUTPATIENT
Start: 2021-01-09 | End: 2021-01-10 | Stop reason: HOSPADM

## 2021-01-09 RX ORDER — DIPHENOXYLATE HYDROCHLORIDE AND ATROPINE SULFATE 2.5; .025 MG/1; MG/1
1 TABLET ORAL DAILY
COMMUNITY

## 2021-01-09 RX ORDER — MIDAZOLAM HYDROCHLORIDE 1 MG/ML
INJECTION INTRAMUSCULAR; INTRAVENOUS AS NEEDED
Status: DISCONTINUED | OUTPATIENT
Start: 2021-01-09 | End: 2021-01-09 | Stop reason: SURG

## 2021-01-09 RX ORDER — MAGNESIUM HYDROXIDE 1200 MG/15ML
LIQUID ORAL AS NEEDED
Status: DISCONTINUED | OUTPATIENT
Start: 2021-01-09 | End: 2021-01-09 | Stop reason: HOSPADM

## 2021-01-09 RX ORDER — FENTANYL CITRATE 50 UG/ML
50 INJECTION, SOLUTION INTRAMUSCULAR; INTRAVENOUS
Status: DISCONTINUED | OUTPATIENT
Start: 2021-01-09 | End: 2021-01-09 | Stop reason: HOSPADM

## 2021-01-09 RX ORDER — DROPERIDOL 2.5 MG/ML
0.62 INJECTION, SOLUTION INTRAMUSCULAR; INTRAVENOUS AS NEEDED
Status: DISCONTINUED | OUTPATIENT
Start: 2021-01-09 | End: 2021-01-09 | Stop reason: HOSPADM

## 2021-01-09 RX ORDER — FAMOTIDINE 20 MG/1
20 TABLET, FILM COATED ORAL 2 TIMES DAILY
Status: DISCONTINUED | OUTPATIENT
Start: 2021-01-09 | End: 2021-01-10 | Stop reason: HOSPADM

## 2021-01-09 RX ORDER — ONDANSETRON 2 MG/ML
INJECTION INTRAMUSCULAR; INTRAVENOUS AS NEEDED
Status: DISCONTINUED | OUTPATIENT
Start: 2021-01-09 | End: 2021-01-09 | Stop reason: SURG

## 2021-01-09 RX ORDER — GLYCOPYRROLATE 0.2 MG/ML
INJECTION INTRAMUSCULAR; INTRAVENOUS AS NEEDED
Status: DISCONTINUED | OUTPATIENT
Start: 2021-01-09 | End: 2021-01-09 | Stop reason: SURG

## 2021-01-09 RX ORDER — SODIUM CHLORIDE 9 MG/ML
INJECTION, SOLUTION INTRAVENOUS AS NEEDED
Status: DISCONTINUED | OUTPATIENT
Start: 2021-01-09 | End: 2021-01-09 | Stop reason: HOSPADM

## 2021-01-09 RX ORDER — DOCUSATE SODIUM 100 MG/1
100 CAPSULE, LIQUID FILLED ORAL 2 TIMES DAILY PRN
Status: DISCONTINUED | OUTPATIENT
Start: 2021-01-09 | End: 2021-01-10 | Stop reason: HOSPADM

## 2021-01-09 RX ORDER — HYDROMORPHONE HYDROCHLORIDE 1 MG/ML
0.5 INJECTION, SOLUTION INTRAMUSCULAR; INTRAVENOUS; SUBCUTANEOUS
Status: DISCONTINUED | OUTPATIENT
Start: 2021-01-09 | End: 2021-01-09 | Stop reason: HOSPADM

## 2021-01-09 RX ORDER — NALOXONE HCL 0.4 MG/ML
0.4 VIAL (ML) INJECTION AS NEEDED
Status: DISCONTINUED | OUTPATIENT
Start: 2021-01-09 | End: 2021-01-09 | Stop reason: HOSPADM

## 2021-01-09 RX ORDER — FENTANYL CITRATE 50 UG/ML
INJECTION, SOLUTION INTRAMUSCULAR; INTRAVENOUS AS NEEDED
Status: DISCONTINUED | OUTPATIENT
Start: 2021-01-09 | End: 2021-01-09 | Stop reason: SURG

## 2021-01-09 RX ORDER — LIDOCAINE HYDROCHLORIDE 10 MG/ML
INJECTION, SOLUTION EPIDURAL; INFILTRATION; INTRACAUDAL; PERINEURAL AS NEEDED
Status: DISCONTINUED | OUTPATIENT
Start: 2021-01-09 | End: 2021-01-09 | Stop reason: SURG

## 2021-01-09 RX ORDER — ONDANSETRON 4 MG/1
4 TABLET, FILM COATED ORAL EVERY 6 HOURS PRN
Status: DISCONTINUED | OUTPATIENT
Start: 2021-01-09 | End: 2021-01-09

## 2021-01-09 RX ORDER — SODIUM CHLORIDE 0.9 % (FLUSH) 0.9 %
3 SYRINGE (ML) INJECTION EVERY 12 HOURS SCHEDULED
Status: DISCONTINUED | OUTPATIENT
Start: 2021-01-09 | End: 2021-01-09 | Stop reason: HOSPADM

## 2021-01-09 RX ORDER — ONDANSETRON 2 MG/ML
4 INJECTION INTRAMUSCULAR; INTRAVENOUS ONCE AS NEEDED
Status: DISCONTINUED | OUTPATIENT
Start: 2021-01-09 | End: 2021-01-09 | Stop reason: HOSPADM

## 2021-01-09 RX ORDER — ACETAMINOPHEN 650 MG/1
650 SUPPOSITORY RECTAL EVERY 4 HOURS PRN
Status: DISCONTINUED | OUTPATIENT
Start: 2021-01-09 | End: 2021-01-10 | Stop reason: HOSPADM

## 2021-01-09 RX ORDER — DEXTROSE AND SODIUM CHLORIDE 5; .45 G/100ML; G/100ML
75 INJECTION, SOLUTION INTRAVENOUS CONTINUOUS
Status: DISCONTINUED | OUTPATIENT
Start: 2021-01-09 | End: 2021-01-10 | Stop reason: HOSPADM

## 2021-01-09 RX ORDER — ONDANSETRON 2 MG/ML
4 INJECTION INTRAMUSCULAR; INTRAVENOUS
Status: DISCONTINUED | OUTPATIENT
Start: 2021-01-09 | End: 2021-01-09

## 2021-01-09 RX ORDER — PROMETHAZINE HYDROCHLORIDE 25 MG/1
25 TABLET ORAL ONCE AS NEEDED
Status: DISCONTINUED | OUTPATIENT
Start: 2021-01-09 | End: 2021-01-09 | Stop reason: HOSPADM

## 2021-01-09 RX ORDER — SODIUM CHLORIDE, SODIUM LACTATE, POTASSIUM CHLORIDE, CALCIUM CHLORIDE 600; 310; 30; 20 MG/100ML; MG/100ML; MG/100ML; MG/100ML
INJECTION, SOLUTION INTRAVENOUS CONTINUOUS PRN
Status: DISCONTINUED | OUTPATIENT
Start: 2021-01-09 | End: 2021-01-09 | Stop reason: SURG

## 2021-01-09 RX ORDER — ASPIRIN 81 MG/1
324 TABLET, CHEWABLE ORAL ONCE
Status: COMPLETED | OUTPATIENT
Start: 2021-01-09 | End: 2021-01-09

## 2021-01-09 RX ORDER — NALOXONE HCL 0.4 MG/ML
0.1 VIAL (ML) INJECTION
Status: DISCONTINUED | OUTPATIENT
Start: 2021-01-09 | End: 2021-01-10 | Stop reason: HOSPADM

## 2021-01-09 RX ORDER — BUPIVACAINE HYDROCHLORIDE AND EPINEPHRINE 2.5; 5 MG/ML; UG/ML
INJECTION, SOLUTION EPIDURAL; INFILTRATION; INTRACAUDAL; PERINEURAL AS NEEDED
Status: DISCONTINUED | OUTPATIENT
Start: 2021-01-09 | End: 2021-01-09 | Stop reason: HOSPADM

## 2021-01-09 RX ORDER — DROPERIDOL 2.5 MG/ML
0.62 INJECTION, SOLUTION INTRAMUSCULAR; INTRAVENOUS ONCE AS NEEDED
Status: DISCONTINUED | OUTPATIENT
Start: 2021-01-09 | End: 2021-01-09 | Stop reason: HOSPADM

## 2021-01-09 RX ORDER — PROPOFOL 10 MG/ML
VIAL (ML) INTRAVENOUS AS NEEDED
Status: DISCONTINUED | OUTPATIENT
Start: 2021-01-09 | End: 2021-01-09 | Stop reason: SURG

## 2021-01-09 RX ORDER — FAMOTIDINE 20 MG/1
40 TABLET, FILM COATED ORAL DAILY
Status: DISCONTINUED | OUTPATIENT
Start: 2021-01-09 | End: 2021-01-10 | Stop reason: HOSPADM

## 2021-01-09 RX ORDER — DEXAMETHASONE SODIUM PHOSPHATE 4 MG/ML
INJECTION, SOLUTION INTRA-ARTICULAR; INTRALESIONAL; INTRAMUSCULAR; INTRAVENOUS; SOFT TISSUE AS NEEDED
Status: DISCONTINUED | OUTPATIENT
Start: 2021-01-09 | End: 2021-01-09 | Stop reason: SURG

## 2021-01-09 RX ORDER — BISACODYL 10 MG
10 SUPPOSITORY, RECTAL RECTAL DAILY PRN
Status: DISCONTINUED | OUTPATIENT
Start: 2021-01-09 | End: 2021-01-10 | Stop reason: HOSPADM

## 2021-01-09 RX ORDER — ONDANSETRON 2 MG/ML
4 INJECTION INTRAMUSCULAR; INTRAVENOUS EVERY 6 HOURS PRN
Status: DISCONTINUED | OUTPATIENT
Start: 2021-01-09 | End: 2021-01-10 | Stop reason: HOSPADM

## 2021-01-09 RX ORDER — OXYCODONE HYDROCHLORIDE AND ACETAMINOPHEN 5; 325 MG/1; MG/1
1 TABLET ORAL EVERY 4 HOURS PRN
Status: DISCONTINUED | OUTPATIENT
Start: 2021-01-09 | End: 2021-01-10 | Stop reason: HOSPADM

## 2021-01-09 RX ORDER — HYDROCODONE BITARTRATE AND ACETAMINOPHEN 5; 325 MG/1; MG/1
1 TABLET ORAL ONCE AS NEEDED
Status: DISCONTINUED | OUTPATIENT
Start: 2021-01-09 | End: 2021-01-09 | Stop reason: HOSPADM

## 2021-01-09 RX ORDER — ROCURONIUM BROMIDE 10 MG/ML
INJECTION, SOLUTION INTRAVENOUS AS NEEDED
Status: DISCONTINUED | OUTPATIENT
Start: 2021-01-09 | End: 2021-01-09 | Stop reason: SURG

## 2021-01-09 RX ORDER — NEOSTIGMINE METHYLSULFATE 1 MG/ML
INJECTION, SOLUTION INTRAVENOUS AS NEEDED
Status: DISCONTINUED | OUTPATIENT
Start: 2021-01-09 | End: 2021-01-09 | Stop reason: SURG

## 2021-01-09 RX ORDER — ACETAMINOPHEN 325 MG/1
650 TABLET ORAL EVERY 4 HOURS PRN
Status: DISCONTINUED | OUTPATIENT
Start: 2021-01-09 | End: 2021-01-09

## 2021-01-09 RX ORDER — DOCUSATE SODIUM 100 MG/1
100 CAPSULE, LIQUID FILLED ORAL 2 TIMES DAILY
Status: DISCONTINUED | OUTPATIENT
Start: 2021-01-09 | End: 2021-01-10 | Stop reason: HOSPADM

## 2021-01-09 RX ORDER — PROMETHAZINE HYDROCHLORIDE 25 MG/1
25 SUPPOSITORY RECTAL ONCE AS NEEDED
Status: DISCONTINUED | OUTPATIENT
Start: 2021-01-09 | End: 2021-01-09 | Stop reason: HOSPADM

## 2021-01-09 RX ORDER — CHOLECALCIFEROL (VITAMIN D3) 125 MCG
5 CAPSULE ORAL NIGHTLY PRN
Status: DISCONTINUED | OUTPATIENT
Start: 2021-01-09 | End: 2021-01-10 | Stop reason: HOSPADM

## 2021-01-09 RX ORDER — ONDANSETRON 2 MG/ML
4 INJECTION INTRAMUSCULAR; INTRAVENOUS EVERY 6 HOURS PRN
Status: DISCONTINUED | OUTPATIENT
Start: 2021-01-09 | End: 2021-01-09

## 2021-01-09 RX ORDER — IPRATROPIUM BROMIDE AND ALBUTEROL SULFATE 2.5; .5 MG/3ML; MG/3ML
3 SOLUTION RESPIRATORY (INHALATION) ONCE AS NEEDED
Status: DISCONTINUED | OUTPATIENT
Start: 2021-01-09 | End: 2021-01-09 | Stop reason: HOSPADM

## 2021-01-09 RX ORDER — ACETAMINOPHEN 160 MG/5ML
650 SOLUTION ORAL EVERY 4 HOURS PRN
Status: DISCONTINUED | OUTPATIENT
Start: 2021-01-09 | End: 2021-01-10 | Stop reason: HOSPADM

## 2021-01-09 RX ADMIN — NEOSTIGMINE 3 MG: 1 INJECTION INTRAVENOUS at 13:14

## 2021-01-09 RX ADMIN — GLYCOPYRROLATE 0.4 MG: 0.4 INJECTION INTRAMUSCULAR; INTRAVENOUS at 13:14

## 2021-01-09 RX ADMIN — LIDOCAINE HYDROCHLORIDE 50 MG: 10 INJECTION, SOLUTION EPIDURAL; INFILTRATION; INTRACAUDAL; PERINEURAL at 11:55

## 2021-01-09 RX ADMIN — FAMOTIDINE 20 MG: 20 TABLET, FILM COATED ORAL at 20:37

## 2021-01-09 RX ADMIN — ASPIRIN 81 MG CHEWABLE TABLET 324 MG: 81 TABLET CHEWABLE at 03:09

## 2021-01-09 RX ADMIN — PROPOFOL 200 MG: 10 INJECTION, EMULSION INTRAVENOUS at 11:55

## 2021-01-09 RX ADMIN — ONDANSETRON 4 MG: 2 INJECTION INTRAMUSCULAR; INTRAVENOUS at 13:07

## 2021-01-09 RX ADMIN — Medication 10 ML: at 20:37

## 2021-01-09 RX ADMIN — DEXAMETHASONE SODIUM PHOSPHATE 8 MG: 4 INJECTION, SOLUTION INTRAMUSCULAR; INTRAVENOUS at 12:00

## 2021-01-09 RX ADMIN — HYDROMORPHONE HYDROCHLORIDE 0.5 MG: 1 INJECTION, SOLUTION INTRAMUSCULAR; INTRAVENOUS; SUBCUTANEOUS at 14:00

## 2021-01-09 RX ADMIN — METRONIDAZOLE 500 MG: 500 INJECTION, SOLUTION INTRAVENOUS at 06:27

## 2021-01-09 RX ADMIN — SODIUM CHLORIDE, POTASSIUM CHLORIDE, SODIUM LACTATE AND CALCIUM CHLORIDE: 600; 310; 30; 20 INJECTION, SOLUTION INTRAVENOUS at 13:07

## 2021-01-09 RX ADMIN — SODIUM CHLORIDE, POTASSIUM CHLORIDE, SODIUM LACTATE AND CALCIUM CHLORIDE: 600; 310; 30; 20 INJECTION, SOLUTION INTRAVENOUS at 11:52

## 2021-01-09 RX ADMIN — SODIUM CHLORIDE 1000 ML: 9 INJECTION, SOLUTION INTRAVENOUS at 04:01

## 2021-01-09 RX ADMIN — HYDROMORPHONE HYDROCHLORIDE 0.5 MG: 1 INJECTION, SOLUTION INTRAMUSCULAR; INTRAVENOUS; SUBCUTANEOUS at 14:10

## 2021-01-09 RX ADMIN — IOPAMIDOL 95 ML: 755 INJECTION, SOLUTION INTRAVENOUS at 04:16

## 2021-01-09 RX ADMIN — MIDAZOLAM 2 MG: 1 INJECTION INTRAMUSCULAR; INTRAVENOUS at 11:54

## 2021-01-09 RX ADMIN — SODIUM CHLORIDE 1 G: 900 INJECTION INTRAVENOUS at 07:30

## 2021-01-09 RX ADMIN — ROCURONIUM BROMIDE 40 MG: 10 INJECTION INTRAVENOUS at 11:55

## 2021-01-09 RX ADMIN — SODIUM CHLORIDE 1000 ML: 9 INJECTION, SOLUTION INTRAVENOUS at 11:06

## 2021-01-09 RX ADMIN — DOCUSATE SODIUM 100 MG: 100 CAPSULE ORAL at 20:37

## 2021-01-09 RX ADMIN — PROPOFOL 25 MCG/KG/MIN: 10 INJECTION, EMULSION INTRAVENOUS at 12:00

## 2021-01-09 RX ADMIN — FENTANYL CITRATE 100 MCG: 50 INJECTION, SOLUTION INTRAMUSCULAR; INTRAVENOUS at 11:55

## 2021-01-09 NOTE — OP NOTE
Operative Report    Patient Name:  Tiffanie Gomez  YOB: 1970  [id number]  1/9/2021      PREOPERATIVE DIAGNOSIS: Acute cholecystitis      POSTOPERATIVE DIAGNOSIS: Same        PROCEDURE PERFORMED:     Laparoscopic cholecystectomy        SURGEON: Bigg Vilchis MD      ASSISTANT: None        SPECIMENS: Gallbladder and contents        ANESTHESIA: General.        FINDINGS:     1. Acutely inflamed gallbladder with edema and cholelithiasis.     INDICATIONS:      The patient is a 50 y.o. female with a history of abdominal pain, concerning for acute cholecystitis. Pre-operative imaging including U/S and CT scan confirmed the diagnosis. The risks and benefits of laparoscopic cholecystectomy were discussed with the patient and they agreed to proceed.        DESCRIPTION OF PROCEDURE:     After obtaining informed consent, the patient was taken to the operating room and placed in the supine position. After appropriate DVT and antibiotic prophylaxis, general anesthesia was induced. The abdomen was prepped and draped in standard sterile fashion, and after infiltrating the skin with local anesthetic, a 12mm skin incision was made superior to the umbilicus. Blunt dissection was carried down to the base of the umbilicus, which was grasped with a Kocher clamp and elevated anteriorly. A vertical midline incision was made in the fascia, and blunt dissection was carried down into the peritoneal cavity. A stay suture of 0 Vicryl was then placed in figure-of-eight fashion around the defect, and a blunt trocar advanced without difficulty into the abdominal cavity. The abdomen was insufflated with carbon dioxide gas to a pressure of 15 mmHg, and a laparoscope advanced through the trocar and the abdominal contents were inspected. There was no evidence of bowel, bladder, or visceral injury with entrance of the trocar. At this point, after infiltrating the skin with local anesthetic, a standard laparoscopic cholecystectomy  "trocar placement schema was followed.     The omentum was adhesed to the gallbladder and was able to be removed from the gallbladder using blunt dissection.  The gallbladder was grasped and elevated superiorly. Using meticulous blunt dissection, the cystic duct and cystic artery were bluntly dissected free of other structures and clearly identified using the \"Critical View\" technique.  There was not significant inflammatory tissue around the structures which was dissected using blunt dissection.  The cystic duct was then clipped at its junction with the infundibulum of the gallbladder, with two clips placed proximally, and the duct was divided between the clips. The cystic artery was then clipped twice proximally and once distally, and divided between clips.     The gallbladder was then dissected free of the gallbladder fossa using a combination of electrocautery and blunt dissection. There was a small posterior branch of the artery that was clipped and divided. The gallbladder was then placed in an Endo Catch bag, and removed from the epigastric trocar site. It was inspected on the back table, correlated with intra-operative findings, and passed off as specimen.     The right upper quadrant was then inspected. The cystic duct and cystic artery stumps were intact without bleeding or biliary leak. The right upper quadrant was irrigated with saline until clear. The abdomen was deflated and reinsufflated to make sure pneumoperitoneum was not tamponading any bleeding and there was none.  Due to the nature of the inflammatory tissue around the cystic duct and cystic artery Surgicel was placed in the area to assure hemostasis.  The abdomen was again irrigated with saline until clear, and all trocars removed under direct and laparoscopic visualization. The fascia at the inferiormost incision was closed using 0 Vicryl suture. The wounds were irrigated with normal saline, and closed in each area using absorbable subcuticular " suture. The incisions were dressed in standard sterile fashion and covered with dry dressings. The patient recovered from anesthesia, was extubated in the operating room, and transferred to the PACU in stable condition.  All sponge and needle counts were correct times two at the completion of the procedure. There were no immediate complications.     Bigg Vilchis MD  1/9/2021  13:30 EST

## 2021-01-09 NOTE — PLAN OF CARE
Problem: Adult Inpatient Plan of Care  Goal: Plan of Care Review  Outcome: Ongoing, Progressing  Flowsheets  Taken 1/9/2021 1527 by Rio Osborne RN  Progress: no change  Outcome Summary: Pt arrived on unit after laparascopic cholycstectomy. VSS. RA. Will continue to monitor.  Taken 1/9/2021 1223 by Maddi Mitchell RN  Plan of Care Reviewed With: patient  Goal: Patient-Specific Goal (Individualized)  Outcome: Ongoing, Progressing  Goal: Absence of Hospital-Acquired Illness or Injury  Outcome: Ongoing, Progressing  Intervention: Identify and Manage Fall Risk  Recent Flowsheet Documentation  Taken 1/9/2021 1441 by Rio Osborne RN  Safety Promotion/Fall Prevention:   activity supervised   assistive device/personal items within reach   clutter free environment maintained   fall prevention program maintained   nonskid shoes/slippers when out of bed   room organization consistent   safety round/check completed   toileting scheduled  Intervention: Prevent Skin Injury  Recent Flowsheet Documentation  Taken 1/9/2021 1441 by Rio Osborne RN  Body Position: position changed independently  Intervention: Prevent and Manage VTE (venous thromboembolism) Risk  Recent Flowsheet Documentation  Taken 1/9/2021 1441 by Rio Osborne RN  VTE Prevention/Management:   bilateral   sequential compression devices on  Intervention: Prevent Infection  Recent Flowsheet Documentation  Taken 1/9/2021 1441 by Rio Osborne RN  Infection Prevention:   visitors restricted/screened   single patient room provided   rest/sleep promoted   personal protective equipment utilized   hand hygiene promoted  Goal: Optimal Comfort and Wellbeing  Outcome: Ongoing, Progressing  Intervention: Provide Person-Centered Care  Recent Flowsheet Documentation  Taken 1/9/2021 1441 by Rio Osborne RN  Trust Relationship/Rapport:   care explained   choices provided  Goal: Readiness for Transition of Care  Outcome: Ongoing, Progressing  Intervention: Mutually Develop  Transition Plan  Recent Flowsheet Documentation  Taken 1/9/2021 1425 by Rio Osborne, RN  Transportation Anticipated: family or friend will provide  Patient/Family Anticipated Services at Transition: none  Patient/Family Anticipates Transition to: home with family  Taken 1/9/2021 1424 by Rio Osborne, RN  Equipment Currently Used at Home: none   Goal Outcome Evaluation:  Plan of Care Reviewed With: patient  Progress: no change  Outcome Summary: Pt arrived on unit after laparascopic cholycstectomy. VSS. RA. Will continue to monitor.

## 2021-01-09 NOTE — ANESTHESIA POSTPROCEDURE EVALUATION
Patient: Tiffanie Gomez    Procedure Summary     Date: 01/09/21 Room / Location:  [facility] [address] [place] [address] [facility]    Anesthesia Start: 1152 Anesthesia Stop:     Procedure: CHOLECYSTECTOMY LAPAROSCOPIC (N/A Abdomen) Diagnosis:       Cholecystitis, acute      (Cholecystitis, acute)    Surgeon: Bigg Vilchis MD Provider: Jonathan Alberts MD    Anesthesia Type: general ASA Status: 2          Anesthesia Type: general    Vitals  No vitals data found for the desired time range.          Post Anesthesia Care and Evaluation    Patient location during evaluation: PACU  Patient participation: complete - patient participated  Level of consciousness: awake and alert  Pain management: adequate  Airway patency: patent  Anesthetic complications: No anesthetic complications  PONV Status: none  Cardiovascular status: hemodynamically stable and acceptable  Respiratory status: nonlabored ventilation, acceptable and nasal cannula  Hydration status: acceptable

## 2021-01-09 NOTE — H&P
[facility]  HISTORY AND PHYSICAL    Patient Name: Tiffanie Gomez  : 1970  MRN: [id number]  Primary Care Physician: Earnest Membreno MD  Date of admission: 2021      Subjective   Subjective     Chief Complaint:   Abdominal Pain for over 3 hours    HPI:  Tiffanie Gomez is a 50 y.o. female with history of Deep Vein Thrombosis in pelvis and Left Leg with a period on warfarin, exercise induced asthma, hypersomnolence with a time on Nuvigil, obesity, anxiety and depression who presented to the ER after having persistent abdominal pain for > 3 hrs.  Her symptoms started at 11 pm last night after having Chili for dinner and a hot pocket for lunch.      She reports that the intensity of pain was not the worst she has ever experienced but the duration of symptoms was alarming at greater than 3 hours she could not get relief from symptoms.      She says she has had 5 or 6 episodes or attacks that would always go away or get better quickly without having to come in.  She reported previous episodes were associated with cold sweats but she never knew she had gallstones.    Gallbladder US in ER consistent with multiple gallstones and Acute Cholecystectomy.    She is hypotensive but on no blood pressure medication.  She is dehydrated on exam this morning even after getting 1 L of NS at 3:22 am    She has already been seen and evaluated by General Surgery and is planned for [facility] sometime today.    Current COVID Risks are:  [] Fever []  Cough [] Shortness of breath [] Fatigue [] Change in taste or smell    [] Exposure to COVID positive patient  [] High risk facility   [x]  NONE    Review of Systems     Gen- No fevers, chills  CV- No chest pain, palpitations  Resp- No cough, dyspnea  GI- No N/V/D, abd pain    All other systems reviewed and are negative.     Personal History     Past Medical History:   Diagnosis Date   • Anxiety    • Depression     Depression/Anxiety   •  DVT (deep venous thrombosis) (CMS/[facility]) 2014    Left leg, on anti coagulation for about 3 months    • Easy bruising    • Fatigue    • History of cervical dysplasia     s/p LEEP, all paps normal since then   • Hypersomnia    • Menorrhagia        Past Surgical History:   Procedure Laterality Date   • APPENDECTOMY OPEN     •  SECTION  1999   •  SECTION  2008   • LASIK      2005?   • LEEP      ~   • OTHER SURGICAL HISTORY  2013    Follow-up venographyafter thrombolysis. Removal of thrombolysis catheter.   • RHINOPLASTY  2000    and tonsils       Family History: family history includes Brain cancer in her paternal aunt; Lung cancer in her father. Otherwise pertinent FHx was reviewed and unremarkable.     Social History:  reports that she has never smoked. She has never used smokeless tobacco. She reports current alcohol use. She reports that she does not use drugs.  Social History     Social History Narrative   • Not on file     Has a Womply  2 kids  2 masters degrees - Education  She is a teacher  Denies smoking  Denies drug abuse  Denies alcohol abuse    Medications:  Available home medication information reviewed.  (Not in a hospital admission)    Multivitamin  Zyrtec  Probiotics    Allergies   Allergen Reactions   • Ampicillin Hives       Objective   Objective     Vital Signs:   Temp:  [97.3 °F (36.3 °C)] 97.3 °F (36.3 °C)  Heart Rate:  [65-87] 67  Resp:  [16] 16  BP: ()/(63-94) 101/69       Physical Exam     Constitutional: Awake, alert  Eyes: PERRLA, sclerae anicteric, no conjunctival injection  HENT: NCAT, dry tongue  Neck: Supple, no JVD, trachea midline  Respiratory: Clear to auscultation bilaterally, nonlabored respirations   Cardiovascular: RRR, s1 and s2  Gastrointestinal: Positive bowel sounds, soft, nontender, obese abdomen  Musculoskeletal: No bilateral ankle edema, no clubbing or cyanosis to extremities  Psychiatric: Appropriate affect,  cooperative  Neurologic: Oriented x 3, strength symmetric in all extremities, Cranial Nerves grossly intact to confrontation, speech clear  Skin: dry, + skin tenting    Results Reviewed:  I have personally reviewed most recent indicated data and agree with findings including:  [x]  Laboratory  [x]  Radiology  []  EKG/Telemetry  []  Pathology  []  Cardiac/Vascular Studies  []  Old records  []  Other:  Most pertinent findings include:   Dehydration and hypotension      LAB RESULTS:      Lab 01/09/21  0311   WBC 11.74*   HEMOGLOBIN 13.5   HEMATOCRIT 43.2   PLATELETS 253   NEUTROS ABS 8.44*   IMMATURE GRANS (ABS) 0.03   LYMPHS ABS 2.35   MONOS ABS 0.76   EOS ABS 0.10   MCV 94.9         Lab 01/09/21  0311   SODIUM 141   POTASSIUM 3.7   CHLORIDE 105   CO2 24.0   ANION GAP 12.0   BUN 13   CREATININE 0.82   GLUCOSE 125*   CALCIUM 9.1         Lab 01/09/21  0311   TOTAL PROTEIN 7.1   ALBUMIN 4.00   GLOBULIN 3.1   ALT (SGPT) 9   AST (SGOT) 13   BILIRUBIN 0.5   ALK PHOS 61   LIPASE 35         Lab 01/09/21  0616 01/09/21  0311   PROBNP  --  54.5   TROPONIN T <0.010 <0.010                 Brief Urine Lab Results  (Last result in the past 365 days)      Color   Clarity   Blood   Leuk Est   Nitrite   Protein   CREAT   Urine HCG        01/09/21 0402               Negative         Microbiology Results (last 10 days)     Procedure Component Value - Date/Time    Respiratory Panel PCR w/COVID-19(SARS-CoV-2) BEATRIZ/HECTOR/VIJAYA/PAD/COR/MAD/PADMINI In-House, NP Swab in UTM/VTM, 3-4 HR TAT - Swab, Nasopharynx [[id number]]  (Normal) Collected: 01/09/21 0825    Lab Status: Final result Specimen: Swab from Nasopharynx Updated: 01/09/21 0953     ADENOVIRUS, PCR Not Detected     Coronavirus 229E Not Detected     Coronavirus HKU1 Not Detected     Coronavirus NL63 Not Detected     Coronavirus OC43 Not Detected     COVID19 Not Detected     Human Metapneumovirus Not Detected     Human Rhinovirus/Enterovirus Not Detected     Influenza A PCR Not Detected      Influenza A H1 Not Detected     Influenza A H1 2009 PCR Not Detected     Influenza A H3 Not Detected     Influenza B PCR Not Detected     Parainfluenza Virus 1 Not Detected     Parainfluenza Virus 2 Not Detected     Parainfluenza Virus 3 Not Detected     Parainfluenza Virus 4 Not Detected     RSV, PCR Not Detected     Bordetella pertussis pcr Not Detected     Bordetella parapertussis PCR Not Detected     Chlamydophila pneumoniae PCR Not Detected     Mycoplasma pneumo by PCR Not Detected    Narrative:      Fact sheet for providers: https://docs.[employer]umbleUpon/wp-content/uploads/QST0178-3753-LL1.1-EUA-Provider-Fact-Sheet-3.pdf    Fact sheet for patients: https://docs.[employer]umbleUpon/wp-content/uploads/LSY0415-8387-EX6.1-EUA-Patient-Fact-Sheet-1.pdf    Test performed by PCR.        Imaging Results (Last 24 Hours)     Procedure Component Value Units Date/Time    US Gallbladder [[id number]] Collected: 01/09/21 0606     Updated: 01/09/21 0608    Narrative:      US Abdomen Ltd    INDICATION:   Right upper quadrant pain for several weeks. Abnormal CT scan.    COMPARISON:   CT abdomen same day    FINDINGS:  PANCREAS: Visualized portions of the pancreas are within normal limits.     LIVER: The echogenicity and echotexture of the hepatic parenchyma is within normal limits.  No hepatic mass. The intrahepatic bile ducts are normal in caliber. The common duct is dilated at the skyla hepatis measuring 8 mm.    GALLBLADDER: Multiple gallstones are present. Additionally, there is gallbladder wall thickening with edema. Gallbladder wall measures 4 to 5 mm in thickness.     RIGHT KIDNEY: The right kidney measures 10.0 cm. Renal cortical thickness and echogenicity is normal. No hydronephrosis.    OTHER: No ascites.      Impression:        1. Cholelithiasis with a thickened edematous gallbladder wall concerning for acute cholecystitis.  2. Mildly dilated common duct at 8 mm with no stones seen within it.  3. Otherwise negative.    Signer  Name: Juan Vincent MD   Signed: 1/9/2021 6:06 AM   Workstation Name: [facility]    Radiology [facility]    CT Abdomen Pelvis With Contrast [[id number]] Collected: 01/09/21 0437     Updated: 01/09/21 0439    Narrative:      CT Abdomen Pelvis W    INDICATION:   Chest and epigastric pain with back pain today.    TECHNIQUE:   CT of the abdomen and pelvis with IV contrast. Delayed images were obtained. Coronal and sagittal reconstructions were obtained.  Radiation dose reduction techniques included automated exposure control or exposure modulation based on body size. Count of  known CT and cardiac nuc med studies performed in previous 12 months: 0.     COMPARISON:   None available.    FINDINGS:  Please see chest CTA report dictated separately. Multiple gallstones are present. Mild degree of gallbladder wall thickening is suspected. Ultrasound is recommended for follow-up. No biliary obstruction is seen. Solid abdominal organs are normal.  Abdominal aorta is normal in caliber. Urinary bladder is normal. Solid pelvic organs are normal. The GI tract is normal. There is no bowel obstruction. The appendix has been removed. No free fluid or adenopathy is identified.      Impression:        1. Gallstones with potential gallbladder wall thickening. Gallbladder ultrasound recommended for follow-up.  2. Appendectomy. Otherwise normal GI tract.  3. Remainder of the abdomen and pelvis is normal.          Signer Name: Juan Vincent MD   Signed: 1/9/2021 4:37 AM   Workstation Name: [facility]    Radiology [facility]    CT Chest Pulmonary Embolism [[id number]] Collected: 01/09/21 0433     Updated: 01/09/21 0435    Narrative:      CT CHEST PULMONARY EMBOLISM W CONTRAST    INDICATION:   Chest and epigastric pain with back pain today.    TECHNIQUE:   CT angiogram of the chest with IV contrast. 3-D reconstructions were obtained and reviewed.   Radiation dose reduction techniques included automated exposure  control or exposure modulation based on body size. Count of known CT and cardiac nuc med studies  performed in previous 12 months: 0.     COMPARISON:   None available.    FINDINGS:   There is no pulmonary embolism or aortic dissection. No pleural or pericardial effusion is seen. There is no adenopathy. The lungs are clear. No CT findings present to indicate pneumonia. Note: CT may be negative in the earliest stages of COVID-19.  Please see abdomen and pelvis CT report dictated separately.      Impression:        1. No PE or aortic dissection.  2. Negative chest CT.    Signer Name: Juan Vincent MD   Signed: 1/9/2021 4:33 AM   [facility]    [facility]    XR Chest 1 View [[id number]] Collected: 01/09/21 0330     Updated: 01/09/21 0332    Narrative:      CR Chest 1 Vw    INDICATION:   Chest pain today.     COMPARISON:    None available.    FINDINGS:  Single portable AP view(s) of the chest.  The heart and mediastinal contours are normal. The lungs are clear. No pneumothorax or pleural effusion.      Impression:      No acute cardiopulmonary findings.    Signer Name: Juan Vincent MD   Signed: 1/9/2021 3:30 AM   Workstation Name: [facility]    [facility]             Assessment/Plan   Assessment & Plan     Active Hospital Problems    Diagnosis POA   • Acute calculous cholecystitis [K80.00] Yes   • Obesity (BMI 30-39.9) [E66.9] Yes   • Dehydration [E86.0] Yes   • Hypotension [I95.9] Yes   • Poor attachment to primary caregiver [Z63.8] Yes     Acute Calculus Cholecystitis  - she has had 5 previous episodes  - this one was not the most severe but lasted the longest  - over 3 hours of pain  - seems to have improved in the ER  - Gallbladder US consistent with Acute Cholecystitis  - history of 3 prior abdominal surgeries  - appendectomy  - c section x 2  - seen by General Surgery - planned for [facility] today  Hypotension  - low blood pressure - less than  90 systolic in ER  - clinical exam consistent with dehydration  - Bolus NS to improve blood pressure prior to induction  Obesity  - history of hypersomnolence and Nuvigil use  - reports history of hypersomnolence  No Primary Care Doctor  - CM to assist finding a Primary Care Doctor for patient  History of DVT  - no longer being treated with blood thinners (was on warfarin at a time in 2014)  - unclear root cause of DVT - no history of hypercoagulable state  History of Idiopathic Hypersomnolence  - was on Nuvigil (for hypersomnolence) in the past - no longer on this medication    DVT prophylaxis:  SCDs    CODE STATUS:  Full Code  Code Status and Medical Interventions:   Ordered at: 01/09/21 0641     Code Status:    CPR     Medical Interventions (Level of Support Prior to Arrest):    Full     Admission Status:  I believe this patient meets INPATIENT status due to acute cholecystitis with need for gallbladder surgery.  I feel patient’s risk for adverse outcomes and need for care warrant INPATIENT evaluation and I predict the patient’s care encounter to likely last beyond 2 midnights.      Julio Cesar Del Cid MD  01/09/21

## 2021-01-09 NOTE — ED PROVIDER NOTES
EMERGENCY DEPARTMENT ENCOUNTER      Pt Name: Tiffanie Gomez  MRN: [id number]  YOB: 1970  Date of evaluation: 2021  Provider: Leon Rhoades DO    CHIEF COMPLAINT       Chief Complaint   Patient presents with   • Chest Pain   • Abdominal Pain   • Back Pain         HISTORY OF PRESENT ILLNESS  (Location/Symptom, Timing/Onset, Context/Setting, Quality, Duration, Modifying Factors, Severity.)   Tiffanie Gomez is a 50 y.o. female who presents to the emergency department for evaluation of epigastric pain radiating underneath the bilateral breast about 3 hours prior to arrival, sharp pain, burning sensation, indigestion type sensation per the patient with some radiation through to her back.  She notes it was not persistent over the last few hours simply severe in intensity which prompted her evaluation in the emergency department.  She states she has had intermittent similar symptoms to a lesser severity in the past which resolved after 30 minutes to an hour.  The patient denies any underlying cardiac or pulmonary disease, no known history of any pancreatitis or gallbladder disease.  She denies any recent illness, fever chills, she endorses some nausea associate with the episode without vomiting, no diarrhea.  The patient denies any history of chronic alcohol use or pancreatitis.  No recent abdominal surgeries.  Prior appendectomy,  many years ago.  Patient denies any other acute systemic complaints at this time.  On arrival to the ED she states her symptoms did improve significantly but still has some mild epigastric pressure.      Nursing notes were reviewed.    REVIEW OF SYSTEMS    (2-9 systems for level 4, 10 or more for level 5)   ROS:  General:  No fevers, no chills, no weakness  Cardiovascular:  + Epigastric, lower retrosternal chest pain, no palpitations  Respiratory:  No shortness of breath, no cough, no wheezing  Gastrointestinal:  No pain, + nausea, no vomiting, no  diarrhea  Musculoskeletal:  No muscle pain, no joint pain  Skin:  No rash, no easy bruising  Neurologic:  No speech problems, no headache  Psychiatric:  No anxiety  Genitourinary:  No dysuria, no hematuria    Except as noted above the remainder of the review of systems was reviewed and negative.       PAST MEDICAL HISTORY     Past Medical History:   Diagnosis Date   • Anxiety    • Depression     Depression/Anxiety   • DVT (deep venous thrombosis) (CMS/[facility]) 2014    Left leg, on anti coagulation for about 3 months    • Easy bruising    • Fatigue    • History of cervical dysplasia     s/p LEEP, all paps normal since then   • Hypersomnia    • Menorrhagia          SURGICAL HISTORY       Past Surgical History:   Procedure Laterality Date   • APPENDECTOMY OPEN     •  SECTION  1999   •  SECTION  2008   • LASIK      ?   • LEEP      ~   • OTHER SURGICAL HISTORY  2013    Follow-up venographyafter thrombolysis. Removal of thrombolysis catheter.   • RHINOPLASTY      and tonsils         CURRENT MEDICATIONS       Current Facility-Administered Medications:   •  cefTRIAXone (ROCEPHIN) 1 g/100 mL 0.9% NS (MBP), 1 g, Intravenous, Once, Leon Rhoades DO  •  metroNIDAZOLE (FLAGYL) 500 mg/100mL IVPB, 500 mg, Intravenous, Once, Leon Rhoades DO, 500 mg at 21 0627  •  ondansetron (ZOFRAN) injection 4 mg, 4 mg, Intravenous, Q30 Min PRN, Leon Rhoades DO  •  sodium chloride 0.9 % flush 10 mL, 10 mL, Intravenous, PRN, Leon Rhoades DO    Current Outpatient Medications:   •  multivitamin (MULTI VITAMIN DAILY PO), Take 1 tablet by mouth Daily., Disp: , Rfl:   •  cetirizine (zyrTEC) 10 MG tablet, Take 10 mg by mouth Daily., Disp: , Rfl:     ALLERGIES     Ampicillin    FAMILY HISTORY       Family History   Problem Relation Age of Onset   • Lung cancer Father    • Brain cancer Paternal Aunt    • Breast cancer Neg Hx    • Ovarian cancer Neg Hx    •  Uterine cancer Neg Hx    • Colon cancer Neg Hx           SOCIAL HISTORY       Social History     Socioeconomic History   • Marital status: Single     Spouse name: Not on file   • Number of children: Not on file   • Years of education: Not on file   • Highest education level: Not on file   Tobacco Use   • Smoking status: Never Smoker   • Smokeless tobacco: Never Used   Substance and Sexual Activity   • Alcohol use: Yes     Comment: occasionally   • Drug use: No   • Sexual activity: Yes     Partners: Male     Birth control/protection: None         PHYSICAL EXAM    (up to 7 for level 4, 8 or more for level 5)     Vitals:    01/09/21 0514 01/09/21 0515 01/09/21 0530 01/09/21 0630   BP:  98/66 98/65 113/78   BP Location:       Patient Position:       Pulse: 67  68 68   Resp:       Temp:       TempSrc:       SpO2: 97%  96% 98%   Weight:       Height:           Physical Exam  General : Patient is awake, alert, oriented, in no acute distress, nontoxic appearing  HEENT: Pupils are equally round and reactive to light, EOMI, conjunctivae clear  Neck: Neck is supple, full range of motion, trachea midline  Cardiac: Heart regular rate, rhythm, no murmurs, rubs, or gallops  Lungs: Lungs are clear to auscultation, there is no wheezing, rhonchi, or rales. There is no use of accessory muscles  Abdomen: Abdomen is soft, nondistended, there is tenderness along the epigastric region, with very mild subjective guarding with no peritoneal signs, bowel sounds are present diffusely throughout.. There are no firm or pulsatile masses, no rebound rigidity or guarding.   Musculoskeletal: 5 out of 5 strength in all 4 extremities.  No focal muscle deficits are appreciated  Neuro: Motor intact, sensory intact, level of consciousness is normal  Dermatology: Skin is warm and dry  Psych: Mentation is grossly normal, cognition is grossly normal. Affect is appropriate.      DIAGNOSTIC RESULTS     EKG: All EKG's are interpreted by the Emergency  Department Physician who either signs or Co-signs this chart in the absence of a cardiologist.    ECG 12 Lead         ECG 12 Lead   Final Result   Test Reason : chest pain   Blood Pressure : **/** mmHG   Vent. Rate : 068 BPM     Atrial Rate : 068 BPM      P-R Int : 134 ms          QRS Dur : 080 ms       QT Int : 396 ms       P-R-T Axes : 042 029 023 degrees      QTc Int : 421 ms      Normal sinus rhythm   Normal ECG   No previous ECGs available   Confirmed by COLLIN ANGELES MD ([id number]) on 1/9/2021 3:21:39 AM      Referred By:  EDMD           Confirmed By:COLLIN ANGELES MD          RADIOLOGY:   Non-plain film images such as CT, Ultrasound and MRI are read by the radiologist. Plain radiographic images are visualized and preliminarily interpreted by the emergency physician with the below findings:      [] Radiologist's Report Reviewed:  US Gallbladder   Final Result      1. Cholelithiasis with a thickened edematous gallbladder wall concerning for acute cholecystitis.   2. Mildly dilated common duct at 8 mm with no stones seen within it.   3. Otherwise negative.      Signer Name: Juan Vincent MD    Signed: 1/9/2021 6:06 AM    Workstation Name: [facility]      CT Chest Pulmonary Embolism   Final Result      1. No PE or aortic dissection.   2. Negative chest CT.      Signer Name: Juan Vincent MD    Signed: 1/9/2021 4:33 AM    Workstation Name: [facility]      CT Abdomen Pelvis With Contrast   Final Result      1. Gallstones with potential gallbladder wall thickening. Gallbladder ultrasound recommended for follow-up.   2. Appendectomy. Otherwise normal GI tract.   3. Remainder of the abdomen and pelvis is normal.               Signer Name: Juan Vincent MD    Signed: 1/9/2021 4:37 AM    Workstation Name: [facility]      XR Chest 1 View   Final Result   No acute cardiopulmonary findings.      Signer  Name: Juan Vincent MD    Signed: 1/9/2021 3:30 AM    Workstation Name: [email or web address]     [facility]            ED BEDSIDE ULTRASOUND:   Performed by ED Physician - none    LABS:    I have reviewed and interpreted all of the currently available lab results from this visit (if applicable):  Results for orders placed or performed during the hospital encounter of 01/09/21   Troponin    Specimen: Blood   Result Value Ref Range    Troponin T <0.010 0.000 - 0.030 ng/mL   Comprehensive Metabolic Panel    Specimen: Blood   Result Value Ref Range    Glucose 125 (H) 65 - 99 mg/dL    BUN 13 6 - 20 mg/dL    Creatinine 0.82 0.57 - 1.00 mg/dL    Sodium 141 136 - 145 mmol/L    Potassium 3.7 3.5 - 5.2 mmol/L    Chloride 105 98 - 107 mmol/L    CO2 24.0 22.0 - 29.0 mmol/L    Calcium 9.1 8.6 - 10.5 mg/dL    Total Protein 7.1 6.0 - 8.5 g/dL    Albumin 4.00 3.50 - 5.20 g/dL    ALT (SGPT) 9 1 - 33 U/L    AST (SGOT) 13 1 - 32 U/L    Alkaline Phosphatase 61 39 - 117 U/L    Total Bilirubin 0.5 0.0 - 1.2 mg/dL    eGFR Non African Amer 74 >60 mL/min/1.73    Globulin 3.1 gm/dL    A/G Ratio 1.3 g/dL    BUN/Creatinine Ratio 15.9 7.0 - 25.0    Anion Gap 12.0 5.0 - 15.0 mmol/L   Lipase    Specimen: Blood   Result Value Ref Range    Lipase 35 13 - 60 U/L   BNP    Specimen: Blood   Result Value Ref Range    proBNP 54.5 0.0 - 450.0 pg/mL   CBC Auto Differential    Specimen: Blood   Result Value Ref Range    WBC 11.74 (H) 3.40 - 10.80 10*3/mm3    RBC 4.55 3.77 - 5.28 10*6/mm3    Hemoglobin 13.5 12.0 - 15.9 g/dL    Hematocrit 43.2 34.0 - 46.6 %    MCV 94.9 79.0 - 97.0 fL    MCH 29.7 26.6 - 33.0 pg    MCHC 31.3 (L) 31.5 - 35.7 g/dL    RDW 13.2 12.3 - 15.4 %    RDW-SD 46.1 37.0 - 54.0 fl    MPV 10.1 6.0 - 12.0 fL    Platelets 253 140 - 450 10*3/mm3    Neutrophil % 71.8 42.7 - 76.0 %    Lymphocyte % 20.0 19.6 - 45.3 %    Monocyte % 6.5 5.0 - 12.0 %    Eosinophil % 0.9 0.3 - 6.2 %    Basophil % 0.5 0.0 - 1.5 %    Immature Grans %  0.3 0.0 - 0.5 %    Neutrophils, Absolute 8.44 (H) 1.70 - 7.00 10*3/mm3    Lymphocytes, Absolute 2.35 0.70 - 3.10 10*3/mm3    Monocytes, Absolute 0.76 0.10 - 0.90 10*3/mm3    Eosinophils, Absolute 0.10 0.00 - 0.40 10*3/mm3    Basophils, Absolute 0.06 0.00 - 0.20 10*3/mm3    Immature Grans, Absolute 0.03 0.00 - 0.05 10*3/mm3    nRBC 0.0 0.0 - 0.2 /100 WBC   POCT pregnancy, urine    Specimen: Urine   Result Value Ref Range    HCG, Urine, QL Negative Negative    Lot Number [id number]     Internal Positive Control Positive     Internal Negative Control Negative    ECG 12 Lead   Result Value Ref Range    QT Interval 396 ms    QTC Interval 421 ms   Light Blue Top   Result Value Ref Range    Extra Tube hold for add-on    Green Top (Gel)   Result Value Ref Range    Extra Tube Hold for add-ons.    Lavender Top   Result Value Ref Range    Extra Tube hold for add-on    Gold Top - SST   Result Value Ref Range    Extra Tube Hold for add-ons.         All other labs were within normal range or not returned as of this dictation.      EMERGENCY DEPARTMENT COURSE and DIFFERENTIAL DIAGNOSIS/MDM:   Vitals:    Vitals:    01/09/21 0514 01/09/21 0515 01/09/21 0530 01/09/21 0630   BP:  98/66 98/65 113/78   BP Location:       Patient Position:       Pulse: 67  68 68   Resp:       Temp:       TempSrc:       SpO2: 97%  96% 98%   Weight:       Height:                Patient with a few hours of epigastric pain rating through to her back and bilateral upper abdominal region which is significantly improved upon arrival to the ED.  No history of pancreatitis or gallbladder disease or significant cardiopulmonary disease.  EKG without acute ischemic changes, we will check cardiac labs, also IV for blood work and imaging of the chest, abdomen and pelvis.  Patient was given symptomatic therapies while in the ED.  Does not any pain control at this time.  Blood work reviewed, white count 11.74, mild left shift, there are changes consistent with  cholelithiasis on the CT scan recommend further evaluation with ultrasound which did reveal thickened gallbladder wall, enlarged common bile duct with multiple gallstones, consistent with acute cholecystitis.  At this point I discussed the case with Dr. Vilchis, general surgery who recommends n.p.o., antibiotics, mission to the hospital for possible surgical intervention later today with a cholecystectomy.  Patient was updated with plan of care, she is in agreement, resting comfortably.  Case discussed with hospitalist team for admission.        MEDICATIONS ADMINISTERED IN ED:  Medications   sodium chloride 0.9 % flush 10 mL (has no administration in time range)   ondansetron (ZOFRAN) injection 4 mg (has no administration in time range)   cefTRIAXone (ROCEPHIN) 1 g/100 mL 0.9% NS (MBP) (has no administration in time range)   metroNIDAZOLE (FLAGYL) 500 mg/100mL IVPB (500 mg Intravenous New Bag 1/9/21 0624)   aspirin chewable tablet 324 mg (324 mg Oral Given 1/9/21 0309)   sodium chloride 0.9 % bolus 1,000 mL (0 mL Intravenous Stopped 1/9/21 0431)   iopamidol (ISOVUE-370) 76 % injection 100 mL (95 mL Intravenous Given 1/9/21 0416)       PROCEDURES:  Procedures    CRITICAL CARE TIME    Total Critical Care time was 0 minutes, excluding separately reportable procedures.   There was a high probability of clinically significant/life threatening deterioration in the patient's condition which required my urgent intervention.      FINAL IMPRESSION      1. Acute calculous cholecystitis          DISPOSITION/PLAN     ED Disposition     ED Disposition Condition Comment    Decision to Admit  Level of Care: Telemetry [5]   Diagnosis: Acute calculous cholecystitis [251536]   Admitting Physician: DIANA ZHAO [[id number]]   Attending Physician: DIANA ZHAO [[id number]]   Bed Request Comments: CDU/tele            PATIENT REFERRED TO:  No follow-up provider specified.    DISCHARGE MEDICATIONS:     Medication List      ASK your doctor about  these medications    cetirizine 10 MG tablet  Commonly known as: zyrTEC     multivitamin tablet tablet                Comment: Please note this report has been produced using speech recognition software.      Leon Rhoades DO  Attending Emergency Physician               Leon Rhoades DO  01/09/21 0649

## 2021-01-09 NOTE — BRIEF OP NOTE
CHOLECYSTECTOMY LAPAROSCOPIC  Progress Note    Tiffanie Gomez  1/9/2021    Pre-op Diagnosis:   Cholecystitis, acute       Post-Op Diagnosis Codes:     * Cholecystitis, acute [K81.0]    Procedure/CPT® Codes:        Procedure(s):  CHOLECYSTECTOMY LAPAROSCOPIC    Surgeon(s):  Bigg Vilchis MD    Anesthesia: General    Staff:   Circulator: Brittni Clark RN; Maddi Mitchell RN  Scrub Person: Mary Carrlilo  Vendor Representative: Cade Devine  Nursing Assistant: Nanette Jefferson         Estimated Blood Loss: 10 mL    Urine Voided: 0 mL    Specimens:                Specimens     ID Source Type Tests Collected By Collected At Frozen?      A Gallbladder Tissue · TISSUE PATHOLOGY EXAM   Bigg Vilchis MD 1/9/21 1234      Description: gallbladder    This specimen was not marked as sent.                Drains: * No LDAs found *    Findings: acutely inflamed gallbladder    Complications: none          Bigg Vilchis MD     Date: 1/9/2021  Time: 13:23 EST

## 2021-01-09 NOTE — CONSULTS
Patient Name:  Tiffanie Gomez  YOB: 1970  [id number]       Patient Care Team:  Earnest Membreno MD as PCP - General  Earnets Membreno MD as PCP - Family Medicine  Marizol Godoy MD as Consulting Physician (Obstetrics and Gynecology)  Marizol Godoy MD as Consulting Physician (Obstetrics and Gynecology)      General Surgery Consult Note     Date of Consultation: 21    Consulting Physician - Julio Cesar Del Cid MD    Reason for Consult - Acute cholecystitis    Subjective     I have been asked to see  Tiffanie Gomez , a 50 y.o. female in consultation for acute cholecystitis from Dr. Del Cid. The patient had an acute onset of epigastric pain that radiated to her back starting at 11pm on 2021. The pain persisted for multiple hours so she presented to the ER. On lab work she had an elevated WBC count and on CT scan she had findings consistent with cholelithiasis and possible cholecystitis. We have been consulted for further treatment.    She has had nausea, but no vomiting. No gabo fevers or chills. No recent change in bowel habits.    Allergy:   Allergies   Allergen Reactions   • Ampicillin Hives       Medications:        No current facility-administered medications on file prior to encounter.      Current Outpatient Medications on File Prior to Encounter   Medication Sig   • multivitamin (MULTI VITAMIN DAILY PO) Take 1 tablet by mouth Daily.   • cetirizine (zyrTEC) 10 MG tablet Take 10 mg by mouth Daily.       PMHx:   Past Medical History:   Diagnosis Date   • Anxiety    • Depression     Depression/Anxiety   • DVT (deep venous thrombosis) (CMS/[facility]) 2014    Left leg, on anti coagulation for about 3 months    • Easy bruising    • Fatigue    • History of cervical dysplasia     s/p LEEP, all paps normal since then   • Hypersomnia    • Menorrhagia        Past Surgical History:  Past Surgical History:   Procedure Laterality Date   • APPENDECTOMY OPEN     •  SECTION   "1999   •  SECTION  2008   • LASIK      2005?   • LEEP      ~1992   • OTHER SURGICAL HISTORY  2013    Follow-up venographyafter thrombolysis. Removal of thrombolysis catheter.   • RHINOPLASTY  2000    and tonsils        Family History:   Family History   Problem Relation Age of Onset   • Lung cancer Father    • Brain cancer Paternal Aunt    • Breast cancer Neg Hx    • Ovarian cancer Neg Hx    • Uterine cancer Neg Hx    • Colon cancer Neg Hx         Social History:   Social History     Socioeconomic History   • Marital status: Single     Spouse name: Not on file   • Number of children: Not on file   • Years of education: Not on file   • Highest education level: Not on file   Tobacco Use   • Smoking status: Never Smoker   • Smokeless tobacco: Never Used   Substance and Sexual Activity   • Alcohol use: Yes     Comment: occasionally   • Drug use: No   • Sexual activity: Yes     Partners: Male     Birth control/protection: None         Review of Systems     Constitutional: No fevers, chills or malaise   Eyes: Denies visual changes    Cardiovascular: Denies chest pain, palpitations   Pulmonary: Denies cough or shortness of breath   Abdominal/ GI: See HPI    Genitourinary: Denies dysuria or hematuria   Musculoskeletal: Denies any but chronic joint aches, pains or deformities   Psychiatric: No recent mood changes   Neurologic: No paresthesias or loss of function          Objective     Physical Exam:      Vital Signs  /74   Pulse 67   Temp 97.3 °F (36.3 °C) (Temporal)   Resp 16   Ht 160 cm (63\")   Wt 77.1 kg (170 lb)   LMP 2020   SpO2 97%   BMI 30.11 kg/m²     Intake/Output Summary (Last 24 hours) at 2021 0917  Last data filed at 2021 0805  Gross per 24 hour   Intake 200 ml   Output --   Net 200 ml         Physical Exam:    Head: Normocephalic, atraumatic.   Eyes: Pupils equal, round, react to light and accommodation.   Mouth: Oral mucosa without lesions  Neck: No masses, " lymphadenopathy or carotid bruits bilaterally  CV: Rhythm and rate regular, no murmurs, rubs or gallops  Lungs: Clear to auscultation bilaterally  Abdomen: Bowel sounds positive, soft, mild tenderness to palpation in epigastric area  Groin : No obvious hernias bilaterally  Extremities:  No cyanosis, clubbing or edema bilaterally  Lymphatics: No abnormal lymphadenopathy appreciated  Neurologic: No gross deficits      Results Review: I have personally reviewed all of the recent lab and imaging results available at this time.   WBCs 11.7  LFTs WNL  CT:  1. Gallstones with potential gallbladder wall thickening.        Assessment/Plan     Assessment and Plan:    Pt with cholelithiasis, epigastric pain, and likely mild acute cholecystitis. Will plan for urgent cholecystectomy. Keep NPO, with IV fluids and IV antibiotics for now.      I discussed the patient's findings and my recommendations with the patient and/or family, as well as the primary team     Bigg Vilchis MD  01/09/21  09:17 EST

## 2021-01-09 NOTE — ANESTHESIA PROCEDURE NOTES
Airway  Urgency: elective    Date/Time: 1/9/2021 11:55 AM  Airway not difficult    General Information and Staff    Patient location during procedure: OR  CRNA: Alex Christine CRNA    Indications and Patient Condition  Indications for airway management: airway protection    Preoxygenated: yes  MILS not maintained throughout  Mask difficulty assessment: 1 - vent by mask    Final Airway Details  Final airway type: endotracheal airway      Successful airway: ETT  Cuffed: yes   Successful intubation technique: direct laryngoscopy  Endotracheal tube insertion site: oral  Blade: Bo[place]  Blade size: 2  ETT size (mm): 7.0  Cormack-Lehane Classification: grade I - full view of glottis  Placement verified by: chest auscultation and capnometry   Measured from: lips  ETT/EBT  to lips (cm): 20  Number of attempts at approach: 1  Assessment: lips, teeth, and gum same as pre-op and atraumatic intubation    Additional Comments  Negative epigastric sounds, Breath sound equal bilaterally with symmetric chest rise and fall

## 2021-01-09 NOTE — ANESTHESIA PREPROCEDURE EVALUATION
Anesthesia Evaluation     Patient summary reviewed and Nursing notes reviewed   no history of anesthetic complications:  NPO Solid Status: > 8 hours  NPO Liquid Status: > 2 hours           Airway   Mallampati: II  TM distance: >3 FB  Neck ROM: full  No difficulty expected  Dental - normal exam     Pulmonary - negative pulmonary ROS and normal exam    breath sounds clear to auscultation  Cardiovascular - normal exam    ECG reviewed  Rhythm: regular  Rate: normal    (+) DVT (remote),       Neuro/Psych- negative ROS  GI/Hepatic/Renal/Endo      ROS Comment: Acute cholecystitis with cholelithiasis    Musculoskeletal (-) negative ROS    Abdominal    Substance History      OB/GYN          Other - negative ROS                       Anesthesia Plan    ASA 2     general     intravenous induction     Anesthetic plan, all risks, benefits, and alternatives have been provided, discussed and informed consent has been obtained with: patient.    Plan discussed with CRNA.

## 2021-01-10 VITALS
HEIGHT: 63 IN | BODY MASS INDEX: 30.12 KG/M2 | SYSTOLIC BLOOD PRESSURE: 117 MMHG | DIASTOLIC BLOOD PRESSURE: 69 MMHG | WEIGHT: 170 LBS | HEART RATE: 91 BPM | TEMPERATURE: 98 F | RESPIRATION RATE: 16 BRPM | OXYGEN SATURATION: 98 %

## 2021-01-10 LAB
ALBUMIN SERPL-MCNC: 3.7 G/DL (ref 3.5–5.2)
ALBUMIN/GLOB SERPL: 1.3 G/DL
ALP SERPL-CCNC: 65 U/L (ref 39–117)
ALT SERPL W P-5'-P-CCNC: 40 U/L (ref 1–33)
ANION GAP SERPL CALCULATED.3IONS-SCNC: 10 MMOL/L (ref 5–15)
AST SERPL-CCNC: 42 U/L (ref 1–32)
BASOPHILS # BLD AUTO: 0.01 10*3/MM3 (ref 0–0.2)
BASOPHILS NFR BLD AUTO: 0.1 % (ref 0–1.5)
BILIRUB SERPL-MCNC: 0.7 MG/DL (ref 0–1.2)
BUN SERPL-MCNC: 8 MG/DL (ref 6–20)
BUN/CREAT SERPL: 10.7 (ref 7–25)
CALCIUM SPEC-SCNC: 8.4 MG/DL (ref 8.6–10.5)
CHLORIDE SERPL-SCNC: 106 MMOL/L (ref 98–107)
CO2 SERPL-SCNC: 25 MMOL/L (ref 22–29)
CREAT SERPL-MCNC: 0.75 MG/DL (ref 0.57–1)
DEPRECATED RDW RBC AUTO: 47.4 FL (ref 37–54)
EOSINOPHIL # BLD AUTO: 0 10*3/MM3 (ref 0–0.4)
EOSINOPHIL NFR BLD AUTO: 0 % (ref 0.3–6.2)
ERYTHROCYTE [DISTWIDTH] IN BLOOD BY AUTOMATED COUNT: 13.2 % (ref 12.3–15.4)
GFR SERPL CREATININE-BSD FRML MDRD: 82 ML/MIN/1.73
GLOBULIN UR ELPH-MCNC: 2.8 GM/DL
GLUCOSE SERPL-MCNC: 129 MG/DL (ref 65–99)
HCT VFR BLD AUTO: 38.5 % (ref 34–46.6)
HGB BLD-MCNC: 12.4 G/DL (ref 12–15.9)
IMM GRANULOCYTES # BLD AUTO: 0.04 10*3/MM3 (ref 0–0.05)
IMM GRANULOCYTES NFR BLD AUTO: 0.3 % (ref 0–0.5)
LYMPHOCYTES # BLD AUTO: 1.09 10*3/MM3 (ref 0.7–3.1)
LYMPHOCYTES NFR BLD AUTO: 8.4 % (ref 19.6–45.3)
MCH RBC QN AUTO: 31.1 PG (ref 26.6–33)
MCHC RBC AUTO-ENTMCNC: 32.2 G/DL (ref 31.5–35.7)
MCV RBC AUTO: 96.5 FL (ref 79–97)
MONOCYTES # BLD AUTO: 0.63 10*3/MM3 (ref 0.1–0.9)
MONOCYTES NFR BLD AUTO: 4.8 % (ref 5–12)
NEUTROPHILS NFR BLD AUTO: 11.27 10*3/MM3 (ref 1.7–7)
NEUTROPHILS NFR BLD AUTO: 86.4 % (ref 42.7–76)
NRBC BLD AUTO-RTO: 0 /100 WBC (ref 0–0.2)
PHOSPHATE SERPL-MCNC: 3 MG/DL (ref 2.5–4.5)
PLATELET # BLD AUTO: 224 10*3/MM3 (ref 140–450)
PMV BLD AUTO: 10.7 FL (ref 6–12)
POTASSIUM SERPL-SCNC: 4.3 MMOL/L (ref 3.5–5.2)
PROT SERPL-MCNC: 6.5 G/DL (ref 6–8.5)
RBC # BLD AUTO: 3.99 10*6/MM3 (ref 3.77–5.28)
SODIUM SERPL-SCNC: 141 MMOL/L (ref 136–145)
WBC # BLD AUTO: 13.04 10*3/MM3 (ref 3.4–10.8)

## 2021-01-10 PROCEDURE — 80053 COMPREHEN METABOLIC PANEL: CPT | Performed by: SURGERY

## 2021-01-10 PROCEDURE — 84100 ASSAY OF PHOSPHORUS: CPT | Performed by: HOSPITALIST

## 2021-01-10 PROCEDURE — 99238 HOSP IP/OBS DSCHRG MGMT 30/<: CPT | Performed by: NURSE PRACTITIONER

## 2021-01-10 PROCEDURE — 25010000002 CEFTRIAXONE PER 250 MG: Performed by: SURGERY

## 2021-01-10 PROCEDURE — 85025 COMPLETE CBC W/AUTO DIFF WBC: CPT | Performed by: SURGERY

## 2021-01-10 RX ORDER — ACETAMINOPHEN 325 MG/1
650 TABLET ORAL EVERY 4 HOURS PRN
Start: 2021-01-10

## 2021-01-10 RX ORDER — OXYCODONE HYDROCHLORIDE AND ACETAMINOPHEN 5; 325 MG/1; MG/1
1 TABLET ORAL EVERY 8 HOURS PRN
Qty: 9 TABLET | Refills: 0 | Status: SHIPPED | OUTPATIENT
Start: 2021-01-10 | End: 2021-10-04

## 2021-01-10 RX ORDER — CETIRIZINE HYDROCHLORIDE 10 MG/1
10 TABLET ORAL DAILY
Status: DISCONTINUED | OUTPATIENT
Start: 2021-01-10 | End: 2021-01-10 | Stop reason: HOSPADM

## 2021-01-10 RX ADMIN — OXYCODONE HYDROCHLORIDE AND ACETAMINOPHEN 1 TABLET: 5; 325 TABLET ORAL at 05:39

## 2021-01-10 RX ADMIN — SODIUM CHLORIDE 1 G: 900 INJECTION INTRAVENOUS at 05:28

## 2021-01-10 RX ADMIN — CETIRIZINE HYDROCHLORIDE 10 MG: 10 TABLET, FILM COATED ORAL at 09:31

## 2021-01-10 RX ADMIN — FAMOTIDINE 20 MG: 20 TABLET, FILM COATED ORAL at 09:34

## 2021-01-10 RX ADMIN — DOCUSATE SODIUM 100 MG: 100 CAPSULE ORAL at 09:31

## 2021-01-10 NOTE — PROGRESS NOTES
"Patient Name:  Tiffanie Gomez  YOB: 1970  [id number]    Surgery Progress Note    Date of visit: 1/10/2021    Subjective   No acute events overnight. Pain well controlled. No nausea/vomiting. No fevers/chills. Tolerating diet.         Objective       /69 (BP Location: Right arm, Patient Position: Lying)   Pulse 91   Temp 98 °F (36.7 °C) (Oral)   Resp 16   Ht 160 cm (63\")   Wt 77.1 kg (170 lb)   LMP 12/27/2020   SpO2 98%   BMI 30.11 kg/m²     Intake/Output Summary (Last 24 hours) at 1/10/2021 0853  Last data filed at 1/9/2021 1315  Gross per 24 hour   Intake 1000 ml   Output 10 ml   Net 990 ml       CV:  Rhythm regular and rate regular  L:  Clear to auscultation bilaterally  Abd:  Bowel sounds positive, soft, appropriately tender, dressings c/d/i  Ext:  No cyanosis, clubbing, edema    Recent labs and imaging that are back at this time have been reviewed.   WBCs 13  Hgb 12.4  AST 42/ ALT40  Tbili/alk phos WNL          Assessment/Plan     Pt POD#1 lap lauryn for acute cholecystitis  Doing well  PO pain control  OOB, IS  DC planning        Bigg Vilchis MD  1/10/2021  08:53 EST      "

## 2021-01-10 NOTE — DISCHARGE SUMMARY
[facility] Medicine Services  DISCHARGE SUMMARY    Patient Name: Tiffanie Gomez  : 1970  MRN: [id number]    Date of Admission: 2021  2:58 AM  Date of Discharge: 01/10/21  Primary Care Physician: Provider, No Known    Consults     Date and Time Order Name Status Description    2021 1435 Inpatient General Surgery Consult Completed           Hospital Course     Presenting Problem:   Acute calculous cholecystitis [K80.00]  Acute calculous cholecystitis [K80.00]  Acute calculous cholecystitis [K80.00]    Active Hospital Problems    Diagnosis  POA   • Acute calculous cholecystitis [K80.00]  Yes   • Obesity (BMI 30-39.9) [E66.9]  Yes   • Dehydration [E86.0]  Yes   • Hypotension [I95.9]  Yes   • Poor attachment to primary caregiver [Z63.8]  Yes      Resolved Hospital Problems   No resolved problems to display.          Hospital Course:  Tiffanie Gomez is a 50 y.o. 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  Her symptoms started 21 after having Chili for dinner and a hot pocket for lunch. She was admitted to the hospitalist service for further treatment of acute cholecystitis.  General surgery was consulted and she was taken to the OR the same day.  She tolerated the procedure well without complications and only minimal blood loss.  She has continued to show improvement, is tolerating po and is stable for dc home       Discharge Follow Up Recommendations for outpatient labs/diagnostics:  PCP 1 week  Dr. Vilchis 2 weeks    Day of Discharge     HPI:   No issues overnight  Tolerating po without vomiting  Reports right shoulder and pain under right breast    Review of Systems  Gen- No fevers, chills  CV- No chest pain, palpitations  Resp- No cough, dyspnea  GI- No N/V/D, abd  pain    Vital Signs:   Temp:  [98 °F (36.7 °C)-98.6 °F (37 °C)] 98 °F (36.7 °C)  Heart Rate:  [58-91] 91  Resp:  [14-16] 16  BP: ()/(58-78) 117/69     Physical Exam:  Constitutional: No acute distress, awake, alert  HENT: NCAT, mucous membranes moist  Respiratory: Clear to auscultation bilaterally, respiratory effort normal   Cardiovascular: RRR, no murmurs, rubs, or gallops  Gastrointestinal: Positive bowel sounds, soft, nontender, nondistended, dressings c/d/i  Musculoskeletal: No bilateral ankle edema  Psychiatric: Appropriate affect, cooperative  Neurologic: Oriented x 3, MULLINS, speech clear  Skin: No rashes      Pertinent  and/or Most Recent Results     Results from last 7 days   Lab Units 01/10/21  0258 01/09/21  0311   WBC 10*3/mm3 13.04* 11.74*   HEMOGLOBIN g/dL 12.4 13.5   HEMATOCRIT % 38.5 43.2   PLATELETS 10*3/mm3 224 253   SODIUM mmol/L 141 141   POTASSIUM mmol/L 4.3 3.7   CHLORIDE mmol/L 106 105   CO2 mmol/L 25.0 24.0   BUN mg/dL 8 13   CREATININE mg/dL 0.75 0.82   GLUCOSE mg/dL 129* 125*   CALCIUM mg/dL 8.4* 9.1     Results from last 7 days   Lab Units 01/10/21  0258 01/09/21  0311   BILIRUBIN mg/dL 0.7 0.5   ALK PHOS U/L 65 61   ALT (SGPT) U/L 40* 9   AST (SGOT) U/L 42* 13       Results from last 7 days   Lab Units 01/09/21  0616 01/09/21  0311   PROBNP pg/mL  --  54.5   TROPONIN T ng/mL <0.010 <0.010       Brief Urine Lab Results  (Last result in the past 365 days)      Color   Clarity   Blood   Leuk Est   Nitrite   Protein   CREAT   Urine HCG        01/09/21 0402               Negative           Microbiology Results Abnormal     Procedure Component Value - Date/Time    Respiratory Panel PCR w/COVID-19(SARS-CoV-2) BEATRIZ/HECTOR/VIJAYA/PAD/COR/MAD/PADMINI In-House, NP Swab in UTM/VTM, 3-4 HR TAT - Swab, Nasopharynx [[id number]]  (Normal) Collected: 01/09/21 0820    Lab Status: Final result Specimen: Swab from Nasopharynx Updated: 01/09/21 0953     ADENOVIRUS, PCR Not Detected     Coronavirus 229E Not Detected      Coronavirus HKU1 Not Detected     Coronavirus NL63 Not Detected     Coronavirus OC43 Not Detected     COVID19 Not Detected     Human Metapneumovirus Not Detected     Human Rhinovirus/Enterovirus Not Detected     Influenza A PCR Not Detected     Influenza A H1 Not Detected     Influenza A H1 2009 PCR Not Detected     Influenza A H3 Not Detected     Influenza B PCR Not Detected     Parainfluenza Virus 1 Not Detected     Parainfluenza Virus 2 Not Detected     Parainfluenza Virus 3 Not Detected     Parainfluenza Virus 4 Not Detected     RSV, PCR Not Detected     Bordetella pertussis pcr Not Detected     Bordetella parapertussis PCR Not Detected     Chlamydophila pneumoniae PCR Not Detected     Mycoplasma pneumo by PCR Not Detected    Narrative:      Fact sheet for providers: https://docs.[employer]/wp-content/uploads/THR5764-1446-JL3.1-EUA-Provider-Fact-Sheet-3.pdf    Fact sheet for patients: https://docs.[employer]/wp-content/uploads/RYF4319-7846-CQ6.1-EUA-Patient-Fact-Sheet-1.pdf    Test performed by PCR.          Imaging Results (All)     Procedure Component Value Units Date/Time    US Gallbladder [[id number]] Collected: 01/09/21 0606     Updated: 01/09/21 0608    Narrative:      US Abdomen Ltd    INDICATION:   Right upper quadrant pain for several weeks. Abnormal CT scan.    COMPARISON:   CT abdomen same day    FINDINGS:  PANCREAS: Visualized portions of the pancreas are within normal limits.     LIVER: The echogenicity and echotexture of the hepatic parenchyma is within normal limits.  No hepatic mass. The intrahepatic bile ducts are normal in caliber. The common duct is dilated at the skyla hepatis measuring 8 mm.    GALLBLADDER: Multiple gallstones are present. Additionally, there is gallbladder wall thickening with edema. Gallbladder wall measures 4 to 5 mm in thickness.     RIGHT KIDNEY: The right kidney measures 10.0 cm. Renal cortical thickness and echogenicity is normal. No hydronephrosis.    OTHER:  No ascites.      Impression:        1. Cholelithiasis with a thickened edematous gallbladder wall concerning for acute cholecystitis.  2. Mildly dilated common duct at 8 mm with no stones seen within it.  3. Otherwise negative.    Signer Name: Juan Vincent MD   Signed: 1/9/2021 6:06 AM   Workstation Name: [facility]    Radiology [facility]    CT Abdomen Pelvis With Contrast [[id number]] Collected: 01/09/21 0437     Updated: 01/09/21 0439    Narrative:      CT Abdomen Pelvis W    INDICATION:   Chest and epigastric pain with back pain today.    TECHNIQUE:   CT of the abdomen and pelvis with IV contrast. Delayed images were obtained. Coronal and sagittal reconstructions were obtained.  Radiation dose reduction techniques included automated exposure control or exposure modulation based on body size. Count of  known CT and cardiac nuc med studies performed in previous 12 months: 0.     COMPARISON:   None available.    FINDINGS:  Please see chest CTA report dictated separately. Multiple gallstones are present. Mild degree of gallbladder wall thickening is suspected. Ultrasound is recommended for follow-up. No biliary obstruction is seen. Solid abdominal organs are normal.  Abdominal aorta is normal in caliber. Urinary bladder is normal. Solid pelvic organs are normal. The GI tract is normal. There is no bowel obstruction. The appendix has been removed. No free fluid or adenopathy is identified.      Impression:        1. Gallstones with potential gallbladder wall thickening. Gallbladder ultrasound recommended for follow-up.  2. Appendectomy. Otherwise normal GI tract.  3. Remainder of the abdomen and pelvis is normal.          Signer Name: Juan Vincent MD   Signed: 1/9/2021 4:37 AM   Workstation Name: [facility]    CT Chest Pulmonary Embolism [[id number]] Collected: 01/09/21 0433     Updated: 01/09/21 0435    Narrative:      CT CHEST PULMONARY EMBOLISM W  CONTRAST    INDICATION:   Chest and epigastric pain with back pain today.    TECHNIQUE:   CT angiogram of the chest with IV contrast. 3-D reconstructions were obtained and reviewed.   Radiation dose reduction techniques included automated exposure control or exposure modulation based on body size. Count of known CT and cardiac nuc med studies  performed in previous 12 months: 0.     COMPARISON:   None available.    FINDINGS:   There is no pulmonary embolism or aortic dissection. No pleural or pericardial effusion is seen. There is no adenopathy. The lungs are clear. No CT findings present to indicate pneumonia. Note: CT may be negative in the earliest stages of COVID-19.  Please see abdomen and pelvis CT report dictated separately.      Impression:        1. No PE or aortic dissection.  2. Negative chest CT.    Signer Name: Juan Vincent MD   Signed: 1/9/2021 4:33 AM   [facility]    [facility]    XR Chest 1 View [[id number]] Collected: 01/09/21 0330     Updated: 01/09/21 0332    Narrative:      CR Chest 1 Vw    INDICATION:   Chest pain today.     COMPARISON:    None available.    FINDINGS:  Single portable AP view(s) of the chest.  The heart and mediastinal contours are normal. The lungs are clear. No pneumothorax or pleural effusion.      Impression:      No acute cardiopulmonary findings.    Signer Name: Juan Vincent MD   Signed: 1/9/2021 3:30 AM   Workstation Name: [facility]    [facility]          Pending Labs     Order Current Status    Tissue Pathology Exam Collected (01/09/21 1234)        Discharge Details        Discharge Medications      New Medications      Instructions Start Date   acetaminophen 325 MG tablet  Commonly known as: TYLENOL   650 mg, Oral, Every 4 Hours PRN      oxyCODONE-acetaminophen 5-325 MG per tablet  Commonly known as: PERCOCET   1 tablet, Oral, Every 8 Hours PRN         Continue These Medications      Instructions  Start Date   cetirizine 10 MG tablet  Commonly known as: zyrTEC   10 mg, Oral, Daily      multivitamin tablet tablet   1 tablet, Oral, Daily             Allergies   Allergen Reactions   • Ampicillin Hives         Discharge Disposition:  Home or Self Care    Diet:  Hospital:  Diet Order   Procedures   • Diet Regular; GI Soft       Activity:  Activity Instructions     Activity as Tolerated      Other Activity Restrictions      Type of Restriction: Other    Explain Other Restrictions: per Dr. Vilchis               CODE STATUS:    Code Status and Medical Interventions:   Ordered at: 01/09/21 0641     Code Status:    CPR     Medical Interventions (Level of Support Prior to Arrest):    Full       Future Appointments   Date Time Provider Department Center   10/4/2021  2:50 PM [facility]       Additional Instructions for the Follow-ups that You Need to Schedule     Discharge Follow-up with PCP   As directed       Currently Documented PCP:    Provider, No Known    PCP Phone Number:    None     Follow Up Details: 1 week         Discharge Follow-up with Specified Provider: Dr. Vilchis, surgery; 2 Weeks   As directed      To: Dr. Vilchis, surgery    Follow Up: 2 Weeks                 KATE Adams  01/10/21      Time Spent on Discharge:  I spent 25 minutes on this discharge activity which included: face-to-face encounter with the patient, reviewing the data in the system, coordination of the care with the nursing staff as well as consultants, documentation, and entering orders.

## 2021-01-10 NOTE — PROGRESS NOTES
Continued Stay Note  [facility]     Patient Name: Tiffanie Gomez  MRN: [id number]  Today's Date: 1/10/2021    Admit Date: 1/9/2021    Discharge Plan     Row Name 01/10/21 1057       Plan    Plan  Home    Patient/Family in Agreement with Plan  yes    Plan Comments  Called patient's room to speak with her about a new PCP and no answer. Called and spoke with her primary nurse and the patient has already left the unit for home. The nurse reported that the patient stated she will inquire on Monday about finding herself a new PCP.    Final Discharge Disposition Code  01 - home or self-care        Discharge Codes    No documentation.       Expected Discharge Date and Time     Expected Discharge Date Expected Discharge Time    John 10, 2021             Michelle Jennings RN

## 2021-01-10 NOTE — PLAN OF CARE
VSS on RA, no c/o NV, no c/o pain, pt ambulated in yates at beginning of shift, passing flatus and belching, tolerating clear liquids well, SR on tele, rested well during shift, will continue to monitor

## 2021-01-10 NOTE — PROGRESS NOTES
"Patient Name:  Tiffanie Gomez  YOB: 1970  [id number]    Surgery Progress Note    Date of visit: 1/9/2021    Subjective   Pt doing well. Some residual RUQ pain, well controlled. No n/v. No f/c. Tolerating clears.         Objective       BP 95/58 (BP Location: Right arm, Patient Position: Lying)   Pulse 88   Temp 98.6 °F (37 °C) (Oral)   Resp 16   Ht 160 cm (63\")   Wt 77.1 kg (170 lb)   LMP 12/27/2020   SpO2 96%   BMI 30.11 kg/m²     Intake/Output Summary (Last 24 hours) at 1/9/2021 1926  Last data filed at 1/9/2021 1315  Gross per 24 hour   Intake 1200 ml   Output 10 ml   Net 1190 ml       CV:  Rhythm regular and rate regular  L:  Clear to auscultation bilaterally  Abd:  Bowel sounds positive, soft, appropriately tender, dressings c/d/i  Ext:  No cyanosis, clubbing, edema         Assessment/Plan     Pt POD#0 lap lauryn  Doing well  Adv diet as tolerated  Pain control  OOB, IS      Bigg Vilchis MD  1/9/2021  19:26 EST      "

## 2021-01-12 LAB
CYTO UR: NORMAL
LAB AP CASE REPORT: NORMAL
LAB AP CLINICAL INFORMATION: NORMAL
PATH REPORT.FINAL DX SPEC: NORMAL
PATH REPORT.GROSS SPEC: NORMAL

## 2021-07-13 ENCOUNTER — TRANSCRIBE ORDERS (OUTPATIENT)
Dept: ADMINISTRATIVE | Facility: HOSPITAL | Age: 51
End: 2021-07-13

## 2021-07-13 DIAGNOSIS — Z12.31 VISIT FOR SCREENING MAMMOGRAM: Primary | ICD-10-CM

## 2021-08-18 ENCOUNTER — APPOINTMENT (OUTPATIENT)
Dept: MAMMOGRAPHY | Facility: HOSPITAL | Age: 51
End: 2021-08-18

## 2021-08-18 ENCOUNTER — HOSPITAL ENCOUNTER (OUTPATIENT)
Dept: MAMMOGRAPHY | Facility: HOSPITAL | Age: 51
Discharge: HOME | End: 2021-08-18
Admitting: OBSTETRICS & GYNECOLOGY

## 2021-08-18 DIAGNOSIS — Z12.31 VISIT FOR SCREENING MAMMOGRAM: ICD-10-CM

## 2021-08-18 PROCEDURE — 77067 SCR MAMMO BI INCL CAD: CPT

## 2021-08-18 PROCEDURE — 77063 BREAST TOMOSYNTHESIS BI: CPT | Performed by: RADIOLOGY

## 2021-08-18 PROCEDURE — 77067 SCR MAMMO BI INCL CAD: CPT | Performed by: RADIOLOGY

## 2021-08-18 PROCEDURE — 77063 BREAST TOMOSYNTHESIS BI: CPT

## 2021-10-04 ENCOUNTER — LAB (OUTPATIENT)
Dept: LAB | Facility: HOSPITAL | Age: 51
End: 2021-10-04

## 2021-10-04 ENCOUNTER — TRANSCRIBE ORDERS (OUTPATIENT)
Dept: LAB | Facility: HOSPITAL | Age: 51
End: 2021-10-04

## 2021-10-04 ENCOUNTER — OFFICE VISIT (OUTPATIENT)
Dept: OBSTETRICS AND GYNECOLOGY | Facility: CLINIC | Age: 51
End: 2021-10-04

## 2021-10-04 VITALS
DIASTOLIC BLOOD PRESSURE: 84 MMHG | SYSTOLIC BLOOD PRESSURE: 120 MMHG | WEIGHT: 176 LBS | HEIGHT: 63 IN | BODY MASS INDEX: 31.18 KG/M2

## 2021-10-04 DIAGNOSIS — N93.9 ABNORMAL UTERINE BLEEDING (AUB): ICD-10-CM

## 2021-10-04 DIAGNOSIS — Z11.3 SCREEN FOR STD (SEXUALLY TRANSMITTED DISEASE): ICD-10-CM

## 2021-10-04 DIAGNOSIS — Z01.419 WELL WOMAN EXAM WITH ROUTINE GYNECOLOGICAL EXAM: Primary | ICD-10-CM

## 2021-10-04 DIAGNOSIS — J30.9 ALLERGIC RHINITIS, UNSPECIFIED SEASONALITY, UNSPECIFIED TRIGGER: Primary | ICD-10-CM

## 2021-10-04 LAB
DEPRECATED RDW RBC AUTO: 45.5 FL (ref 37–54)
ERYTHROCYTE [DISTWIDTH] IN BLOOD BY AUTOMATED COUNT: 12.7 % (ref 12.3–15.4)
HCG INTACT+B SERPL-ACNC: <0.1 MIU/ML
HCT VFR BLD AUTO: 45.1 % (ref 34–46.6)
HGB BLD-MCNC: 14.4 G/DL (ref 12–15.9)
MCH RBC QN AUTO: 31 PG (ref 26.6–33)
MCHC RBC AUTO-ENTMCNC: 31.9 G/DL (ref 31.5–35.7)
MCV RBC AUTO: 97 FL (ref 79–97)
PLATELET # BLD AUTO: 256 10*3/MM3 (ref 140–450)
PMV BLD AUTO: 11.4 FL (ref 6–12)
PROLACTIN SERPL-MCNC: 9.26 NG/ML (ref 4.79–23.3)
RBC # BLD AUTO: 4.65 10*6/MM3 (ref 3.77–5.28)
TSH SERPL DL<=0.05 MIU/L-ACNC: 2.34 UIU/ML (ref 0.27–4.2)
WBC # BLD AUTO: 8.94 10*3/MM3 (ref 3.4–10.8)

## 2021-10-04 PROCEDURE — 84702 CHORIONIC GONADOTROPIN TEST: CPT

## 2021-10-04 PROCEDURE — 85027 COMPLETE CBC AUTOMATED: CPT

## 2021-10-04 PROCEDURE — 84146 ASSAY OF PROLACTIN: CPT

## 2021-10-04 PROCEDURE — 99396 PREV VISIT EST AGE 40-64: CPT | Performed by: OBSTETRICS & GYNECOLOGY

## 2021-10-04 PROCEDURE — 84443 ASSAY THYROID STIM HORMONE: CPT

## 2021-10-04 NOTE — PROGRESS NOTES
Subjective   Chief Complaint   Patient presents with   • Annual Exam     c/o cycles being every 2 weeks for the past 3 cycles     Tiffanie Gomez is a 51 y.o. year old  presenting to be seen for her annual exam.     SEXUAL Hx:  She is not currently sexually active.  In the past year there there has been NO new sexual partners.    Condoms are never used.  She would not like to be screened for STD's at today's exam.  Current birth control method: abstinence.  She is happy with her current method of contraception and does not want to discuss alternative methods of contraception.  MENSTRUAL Hx:  Patient's last menstrual period was 2021 (exact date).  In the past 1-2 months her cycles have been regular, predictable and occur every 2 weeks.  Her menstrual flow is typically normal.   Each month on average there are roughly 0 day(s) of very heavy flow.    Duration 7 days.   Intermenstrual bleeding is see above.    Post-coital bleeding is absent.  Dysmenorrhea: none  PMS: none  Her cycles are not a source of concern for her that she wishes to discuss today.  HEALTH Hx:  She exercises regularly: yes.  She wears her seat belt: yes.  She has concerns about domestic violence: no.  OTHER THINGS SHE WANTS TO DISCUSS TODAY:  Nothing else    The following portions of the patient's history were reviewed and updated as appropriate:problem list, current medications, allergies, past family history, past medical history, past social history and past surgical history.    Social History    Tobacco Use      Smoking status: Never Smoker      Smokeless tobacco: Never Used    Review of Systems  Constitutional POS: nothing reported    NEG: anorexia or night sweats   Genitourinary POS: REI not affecting ADLs    NEG: dysuria or hematuria      Gastointestinal POS: nothing reported    NEG: bloating, change in bowel habits, melena or reflux symptoms   Integument POS: nothing reported    NEG: moles that are changing in size, shape,  "color or rashes   Breast POS: nothing reported    NEG: persistent breast lump, skin dimpling or nipple discharge        Objective   /84 (BP Location: Left arm, Patient Position: Sitting, Cuff Size: Adult)   Ht 160 cm (63\")   Wt 79.8 kg (176 lb)   LMP 09/24/2021 (Exact Date)   Breastfeeding No Comment: happening every 2 weeks  BMI 31.18 kg/m²     General:  well developed; well nourished  no acute distress   Skin:  No suspicious lesions seen   Thyroid: normal to inspection and palpation   Breasts:  Examined in supine position  Symmetric without masses or skin dimpling  Nipples normal without inversion, lesions or discharge  There are no palpable axillary nodes   Abdomen: soft, non-tender; no masses  no umbilical or inguinal hernias are present  no hepato-splenomegaly   Pelvis: Clinical staff was present for exam  External genitalia:  normal appearance of the external genitalia including Bartholin's and [place]'s glands.  :  urethral meatus normal;  Vaginal:  normal pink mucosa without prolapse or lesions.  Cervix:  normal appearance.  Uterus:  normal size, shape and consistency.  Adnexa:  normal bimanual exam of the adnexa.  Rectal:  digital rectal exam not performed; anus visually normal appearing.        Assessment   1. Normal GYN exam  2. AUB - new problem that needs further work up     Plan   Pap was not done today.  I explained to Tiffanie that the recommendations for Pap smear interval in a low risk patient has lengthened to 5 years time if history of normal pap co test.  I told Tiffanie she still needs to be seen in our office yearly for a full physical including breast and pelvic exam.  She was encouraged to get yearly mammograms.  She should report any palpable breast lump(s) or skin changes regardless of mammographic findings.  I explained to Tiffanie that notification regarding her mammogram results will come from the center performing the study.  Our office will not be routinely calling with mammogram " results.  It is her responsibility to make sure that the results from the mammogram are communicated to her by the breast center.  If she has any questions about the results, she is welcome to call our office anytime.  Colonoscopy was recommended for screening for colon cancer.  The procedure was briefly discussed and its benefits for early detection of colon cancer were emphasized.  I explained to Tiffanie that we could help her to schedule it if she wishes.  Additionally, she could also contact her primary care physician to help make this arrangement.  After considering these options she declines colonoscopy as well as Cologuard.  The following tests were ordered today: TSH, CBC, HCG, PRL and leukorrhea swab.  It was explained to Tiffanie that all lab test should be back within the one week after they are performed. She will be notified about the results, regardless of the findings. If she has not been contacted by the office within 2 weeks after the test has been performed, it is her responsibility to contact us to learn about her results  No prescription was given or electronically sent at today's visit  The importance of keeping all planned follow-up and taking all medications as prescribed was emphasized.  Follow up for annual exam in one year   Ultrasound needs to be done after her next menses.       No orders of the defined types were placed in this encounter.        Orders Placed This Encounter   Procedures   • Chlamydia trachomatis, Neisseria gonorrhoeae, Trichomonas vaginalis, PCR - Swab, Cervix     MDL test # 121     Standing Status:   Future     Number of Occurrences:   1     Standing Expiration Date:   11/3/2021     Order Specific Question:   Release to patient     Answer:   Immediate   • US Non-ob Transvaginal     Standing Status:   Future     Standing Expiration Date:   10/4/2022     Order Specific Question:   Reason for Exam:     Answer:   irregular menses, menstrual cycles very 2 weeks   • TSH Rfx On  Abnormal To Free T4     Standing Status:   Future     Number of Occurrences:   1     Standing Expiration Date:   10/4/2022     Order Specific Question:   Release to patient     Answer:   Immediate   • CBC (No Diff)     Standing Status:   Future     Number of Occurrences:   1     Standing Expiration Date:   10/4/2022     Order Specific Question:   Release to patient     Answer:   Immediate   • hCG, Quantitative, Pregnancy     Standing Status:   Future     Number of Occurrences:   1     Standing Expiration Date:   10/4/2022     Order Specific Question:   Release to patient     Answer:   Immediate   • Prolactin     Standing Status:   Future     Number of Occurrences:   1     Standing Expiration Date:   10/4/2022     Order Specific Question:   Release to patient     Answer:   Immediate   • Cologuard - Stool, Per Rectum     Standing Status:   Future     Standing Expiration Date:   10/4/2022     Order Specific Question:   Release to patient     Answer:   Immediate         This note was electronically signed.    Marizol Godoy MD  October 4, 2021    Note: Speech recognition transcription software may have been used to create portions of this document.  An attempt at proofreading has been made but errors in transcription could still be present.

## 2021-11-24 ENCOUNTER — OFFICE VISIT (OUTPATIENT)
Dept: OBSTETRICS AND GYNECOLOGY | Facility: CLINIC | Age: 51
End: 2021-11-24

## 2021-11-24 VITALS — HEIGHT: 63 IN | WEIGHT: 180.2 LBS | BODY MASS INDEX: 31.93 KG/M2

## 2021-11-24 DIAGNOSIS — N93.9 ABNORMAL UTERINE BLEEDING (AUB): Primary | ICD-10-CM

## 2021-11-24 PROCEDURE — 99213 OFFICE O/P EST LOW 20 MIN: CPT | Performed by: OBSTETRICS & GYNECOLOGY

## 2021-11-24 NOTE — PROGRESS NOTES
"Subjective   Chief Complaint   Patient presents with   • Follow-up     US today.      Tiffanie Boyd is a 51 y.o. year old  who comes to review her recent testing and discuss next steps. She had reported at her annual exam in October having a few more frequent cycles. She reports more recently they have spaced back out. She has questions about her blood work records from her hematologist when she had her blood clot as she wants to know if her daughter will be able to use birth control pills.     OTHER THINGS SHE WANTS TO DISCUSS TODAY:  Nothing else       Objective   Ht 160 cm (63\")   Wt 81.7 kg (180 lb 3.2 oz)   LMP 11/15/2021 (Exact Date)   Breastfeeding No   BMI 31.92 kg/m²     Lab Review   No data reviewed    Imaging   Pelvic ultrasound report       Assessment   1. Abnormal uterine bleeding without focal defect seen on ultrasound today      Plan   1. Reviewed given AUB with age greater than 45 years old endometrial sampling is indicated. Reviewed utility of SIS and/or hysteroscopy to further evaluate for focal defects but patient declines to pursue this route as she states things have improved. Plan to proceed with office endometrial biopsy  2. The following data needs to be obtained to update her medical records: coagulopathy work up from hematology .  3. The importance of keeping all planned follow-up and taking all medications as prescribed was emphasized.  4. Follow up office endometrial biopsy.     No orders of the defined types were placed in this encounter.         Total time spent today with Tiffanie  was 20-29 minutes (level 3).  Greater than 50% of the time was spent face-to-face coordinating care, answering her questions and counseling regarding pathophysiology of her presenting problem along with plans for any diagnositc work-up and treatment.    This note was electronically signed.    Marizol Godoy MD  2021    "

## 2022-01-17 ENCOUNTER — TELEPHONE (OUTPATIENT)
Dept: OBSTETRICS AND GYNECOLOGY | Facility: CLINIC | Age: 52
End: 2022-01-17

## 2022-01-17 DIAGNOSIS — Z86.718 HISTORY OF DVT (DEEP VEIN THROMBOSIS): Primary | ICD-10-CM

## 2022-01-17 NOTE — TELEPHONE ENCOUNTER
Jayne  PT's Child has some health issues discussed with Dr. Godoy at a previous visit.  We had checked with vein  and they had done no genetic testing.  PT's daughter's pediatrician would like our PT to be tested for the following :  Protein c, protien s, and antithrombin III (endongenous anti-cpagi;amt factors)  Fasting plasma homocysteine level  Prothrombin [id number] (prothrombin gene mutation: Glu-Arg at position 80361)  Factor V Leiden Mutation     PT brought in letter with the information on it and will be scanned into chart.

## 2022-01-18 ENCOUNTER — LAB (OUTPATIENT)
Dept: LAB | Facility: HOSPITAL | Age: 52
End: 2022-01-18

## 2022-01-18 DIAGNOSIS — Z86.718 HISTORY OF DVT (DEEP VEIN THROMBOSIS): ICD-10-CM

## 2022-01-18 LAB — HCYS SERPL-MCNC: 7.9 UMOL/L (ref 0–15)

## 2022-01-18 PROCEDURE — 85300 ANTITHROMBIN III ACTIVITY: CPT

## 2022-01-18 PROCEDURE — 81241 F5 GENE: CPT

## 2022-01-18 PROCEDURE — 85210 CLOT FACTOR II PROTHROM SPEC: CPT

## 2022-01-18 PROCEDURE — 85303 CLOT INHIBIT PROT C ACTIVITY: CPT

## 2022-01-18 PROCEDURE — 85306 CLOT INHIBIT PROT S FREE: CPT

## 2022-01-18 PROCEDURE — 83090 ASSAY OF HOMOCYSTEINE: CPT

## 2022-01-18 NOTE — TELEPHONE ENCOUNTER
Patient can be informed the labs have been ordered.     Orders Placed This Encounter   Procedures   • Factor 5 Leiden     Standing Status:   Future     Standing Expiration Date:   1/17/2023     Order Specific Question:   Release to patient     Answer:   Immediate   • Homocysteine     Standing Status:   Future     Standing Expiration Date:   1/17/2023     Order Specific Question:   Release to patient     Answer:   Immediate   • Antithrombin III     Standing Status:   Future     Standing Expiration Date:   1/17/2023     Order Specific Question:   Release to patient     Answer:   Immediate   • Protein C Activity     Standing Status:   Future     Standing Expiration Date:   1/17/2023     Order Specific Question:   Release to patient     Answer:   Immediate   • Protein S Functional     Standing Status:   Future     Standing Expiration Date:   1/17/2023     Order Specific Question:   Release to patient     Answer:   Immediate   • Factor 2 Activity     Standing Status:   Future     Standing Expiration Date:   1/17/2023     Order Specific Question:   Release to patient     Answer:   Immediate     Thanks, Marizol Godoy MD

## 2022-01-19 LAB
F5 GENE MUT ANL BLD/T: NORMAL
PROTHROM ACT/NOR PPP: 142 % (ref 50–154)
SPECIMEN STATUS: NORMAL

## 2022-01-20 LAB
AT III ACT/NOR PPP CHRO: 109 % (ref 75–135)
PROT S ACT/NOR PPP: 86 % (ref 63–140)

## 2022-01-26 LAB
PROT C ACT/NOR PPP: 133 % (ref 73–180)
SPECIMEN STATUS: NORMAL

## 2022-09-01 ENCOUNTER — TRANSCRIBE ORDERS (OUTPATIENT)
Dept: ADMINISTRATIVE | Facility: HOSPITAL | Age: 52
End: 2022-09-01

## 2022-09-01 DIAGNOSIS — Z12.31 VISIT FOR SCREENING MAMMOGRAM: Primary | ICD-10-CM

## 2022-10-04 ENCOUNTER — HOSPITAL ENCOUNTER (OUTPATIENT)
Dept: MAMMOGRAPHY | Facility: HOSPITAL | Age: 52
Discharge: HOME | End: 2022-10-04
Admitting: OBSTETRICS & GYNECOLOGY

## 2022-10-04 DIAGNOSIS — Z12.31 VISIT FOR SCREENING MAMMOGRAM: ICD-10-CM

## 2022-10-04 PROCEDURE — 77063 BREAST TOMOSYNTHESIS BI: CPT | Performed by: RADIOLOGY

## 2022-10-04 PROCEDURE — 77067 SCR MAMMO BI INCL CAD: CPT

## 2022-10-04 PROCEDURE — 77063 BREAST TOMOSYNTHESIS BI: CPT

## 2022-10-04 PROCEDURE — 77067 SCR MAMMO BI INCL CAD: CPT | Performed by: RADIOLOGY

## 2023-08-31 ENCOUNTER — TRANSCRIBE ORDERS (OUTPATIENT)
Dept: ADMINISTRATIVE | Facility: HOSPITAL | Age: 53
End: 2023-08-31
Payer: COMMERCIAL

## 2023-08-31 DIAGNOSIS — Z12.31 VISIT FOR SCREENING MAMMOGRAM: Primary | ICD-10-CM

## 2023-10-07 ENCOUNTER — HOSPITAL ENCOUNTER (OUTPATIENT)
Dept: MAMMOGRAPHY | Facility: HOSPITAL | Age: 53
Discharge: HOME | End: 2023-10-07
Admitting: OBSTETRICS & GYNECOLOGY
Payer: COMMERCIAL

## 2023-10-07 DIAGNOSIS — Z12.31 VISIT FOR SCREENING MAMMOGRAM: ICD-10-CM

## 2023-10-07 PROCEDURE — 77067 SCR MAMMO BI INCL CAD: CPT

## 2023-10-07 PROCEDURE — 77063 BREAST TOMOSYNTHESIS BI: CPT

## 2024-06-14 NOTE — TELEPHONE ENCOUNTER
I have sent diflucan x 2 doses to pharmacy--[facility]. Please encourage to call office for appointment if symptoms persist or worsen despite treatment. Thanks!   14-Jun-2024 21:31

## 2024-07-02 ENCOUNTER — OFFICE VISIT (OUTPATIENT)
Dept: OBSTETRICS AND GYNECOLOGY | Facility: CLINIC | Age: 54
End: 2024-07-02
Payer: COMMERCIAL

## 2024-07-02 VITALS
SYSTOLIC BLOOD PRESSURE: 112 MMHG | DIASTOLIC BLOOD PRESSURE: 70 MMHG | WEIGHT: 190 LBS | BODY MASS INDEX: 33.66 KG/M2 | RESPIRATION RATE: 16 BRPM

## 2024-07-02 DIAGNOSIS — Z01.419 ENCOUNTER FOR WELL WOMAN EXAM WITH ROUTINE GYNECOLOGICAL EXAM: Primary | ICD-10-CM

## 2024-07-02 NOTE — PROGRESS NOTES
Subjective   Chief Complaint   Patient presents with    Annual Exam     Tiffanie Brennan is a 54 y.o. year old  presenting to be seen for her annual exam. She is overall feeling well today.     SEXUAL Hx:  She is currently sexually active.  In the past year there there has been ONE new sexual partner.    Condoms are never used.  She would not like to be screened for STD's at today's exam.  Current birth control method: rhythm method.  She is happy with her current method of contraception and does not want to discuss alternative methods of contraception.  MENSTRUAL Hx:  Patient's last menstrual period was 2024.  In the past 6 months her cycles have been regular, predictable and occur monthly.  Her menstrual flow has been heavy some months and light other months.   Each month on average there are roughly 0 day(s) of very heavy flow.    Intermenstrual bleeding is absent.    Post-coital bleeding is absent.  Dysmenorrhea: mild and is not affecting her activities of daily living  PMS: mild and is not affecting her activities of daily living  Her cycles are not a source of concern for her that she wishes to discuss today.  HEALTH Hx:  She exercises regularly: yes. She uses the [employer] sera, but does have a limit in mobility from knee surgery a year ago.   She wears her seat belt: yes.  She has concerns about domestic violence: no.  OTHER THINGS SHE WANTS TO DISCUSS TODAY:  Nothing else    The following portions of the patient's history were reviewed and updated as appropriate:problem list, current medications, allergies, past family history, past medical history, past social history, and past surgical history.    Social History    Tobacco Use      Smoking status: Never      Smokeless tobacco: Never    Past Medical History:   Diagnosis Date    Anxiety     Depression     Depression/Anxiety    DVT (deep venous thrombosis) 2014    Left leg, on anti coagulation for about 3 months     Easy bruising     Fatigue      History of cervical dysplasia     s/p LEEP, all paps normal since then    Hypersomnia     Menorrhagia      Past Surgical History:   Procedure Laterality Date    APPENDECTOMY OPEN       SECTION  1999     SECTION  2008    CHOLECYSTECTOMY N/A 2021    Procedure: CHOLECYSTECTOMY LAPAROSCOPIC;  Surgeon: Bigg Vilchis MD;  Location: [facility];  Service: General;  Laterality: N/A;    LASIK      2005?    LEEP      ~1992    OTHER SURGICAL HISTORY  2013    Follow-up venographyafter thrombolysis. Removal of thrombolysis catheter.    RHINOPLASTY      and tonsils         Review of Systems   Constitutional: Negative.  Negative for appetite change and diaphoresis.   Respiratory: Negative.     Cardiovascular: Negative.    Gastrointestinal: Negative.  Negative for abdominal distention, blood in stool, GERD and indigestion.   Genitourinary: Negative.  Negative for breast discharge, breast lump, breast pain, dysuria and hematuria.   Skin: Negative.           Objective   /70   Resp 16   Wt 86.2 kg (190 lb)   LMP 2024   BMI 33.66 kg/m²     Physical Exam  Vitals reviewed. Exam conducted with a chaperone present.   Constitutional:       Appearance: Normal appearance.   Cardiovascular:      Rate and Rhythm: Normal rate and regular rhythm.      Heart sounds: Normal heart sounds.   Pulmonary:      Effort: Pulmonary effort is normal.      Breath sounds: Normal breath sounds.   Chest:   Breasts:     Right: Normal.      Left: Normal.   Abdominal:      General: Bowel sounds are normal.      Palpations: Abdomen is soft.   Genitourinary:     General: Normal vulva.      Exam position: Lithotomy position.      Vagina: Normal.      Cervix: Normal.      Uterus: Normal.       Adnexa: Right adnexa normal and left adnexa normal.      Rectum: Normal.      Comments: Rectal exam not done but appears normal visually  Neurological:      Mental Status: She is alert and oriented to person,  place, and time.   Psychiatric:         Mood and Affect: Mood normal.         Behavior: Behavior normal.         Thought Content: Thought content normal.         Judgment: Judgment normal.            Diagnoses and all orders for this visit:    1. Encounter for well woman exam with routine gynecological exam (Primary)  -     LIQUID-BASED PAP SMEAR WITH HPV GENOTYPING REGARDLESS OF INTERPRETATION ([facility]); Future  -     LIQUID-BASED PAP SMEAR WITH HPV GENOTYPING REGARDLESS OF INTERPRETATION ([facility])        No prescription was given or electronically sent at today's visit    The importance of keeping all planned follow-up and taking all medications as prescribed was emphasized.    Today I discussed with Tiffanie the total recommended calcium intake for a premenopausal female is 1000 mg.  Ideally this should be from dietary sources.  I reviewed calcium content in various foods including milk, fortified orange juice and yogurt.  If she cannot get sufficient calcium through dietary means, it is recommended to supplement with either a multivitamin or calcium to reach her daily goal.  I also reviewed the difference in the bioavailability of calcium carbonate and calcium citrate containing supplements and the importance of taking calcium carbonate containing products with food.  Finally, vitamin D's role in calcium absorption was reviewed and a total daily vitamin D intake of 800 units was recommended.    I discussed with Tiffanie that she may be behind on needed vaccinations for  vaccines are up to date .  She may be able to obtain these vaccinations at her local pharmacy OR speak about obtaining them with her primary care.  If she does obtain her vaccines, I have asked Tiffanie to let us know the date each vaccine was obtained so that her medical record could be updated in our system.    No orders of the defined types were placed in this encounter.           Return in about 1 year (around 7/2/2025) for Annual  physical.    Meche Desouza, APRN  July 2, 2024

## 2024-07-09 LAB — REF LAB TEST METHOD: NORMAL

## 2024-07-16 ENCOUNTER — TELEPHONE (OUTPATIENT)
Dept: OBSTETRICS AND GYNECOLOGY | Facility: CLINIC | Age: 54
End: 2024-07-16
Payer: COMMERCIAL

## 2024-07-16 ENCOUNTER — TRANSCRIBE ORDERS (OUTPATIENT)
Dept: ADMINISTRATIVE | Facility: HOSPITAL | Age: 54
End: 2024-07-16
Payer: COMMERCIAL

## 2024-07-16 DIAGNOSIS — Z12.31 VISIT FOR SCREENING MAMMOGRAM: Primary | ICD-10-CM

## 2024-07-16 NOTE — TELEPHONE ENCOUNTER
Caller: Tiffanie Brennan    Relationship: Self    Best call back number: [phone] / LVM    What orders are you requesting (i.e. lab or imaging): MAMMO    In what timeframe would the patient need to come in: ASAP    Where will you receive your lab/imaging services:     Additional notes: PT CALLED TO SET UP HER MAMMO APPT AND TOLD THEM THAT SHE WAS HAVING SOME PAIN IN HER NIPPLES - THEY ADVISED THE PT TO CONTACT DR NELSON TO SEE IF SHE WANTED TO CHANGE THE ORDERS TO A DIAGNOSTIC MAMMO INSTEAD    PLEASE CALL THE PT TO LET HER KNOW IF THE ORDERS WILL BE CHANGING OR NOT    THANK YOU!

## 2024-07-16 NOTE — TELEPHONE ENCOUNTER
Called and spoke with patient and confirmed that this is a new pain since her last exam.  She has been scheduled with KATE Mcginnis for a clinical breast exam on 7/25/2024.  No further questions at this time.

## 2024-07-24 NOTE — PROGRESS NOTES
"Subjective   Chief Complaint   Patient presents with    Breast Problem     Tiffanie Brennan is a 54 y.o. year old .  Patient's last menstrual period was 2024 (approximate).  She presents to be seen for clinical breast exam.  She had her screening mammogram in the process of being scheduled however she reported new onset of nipple pain.  She last had a breast exam with Meche at her annual earlier this month.  No breast concerns noted at that time. She reports the week before her cycle this last month she had bilateral nipple tenderness. She states this is not her normal. She denies any change to her exercise routine or known injury to breast. She denies nipple discharge but will occasionally get crusting on her nipples. She denies palpable areas of concern to either breast, no skin dimpling or changes in the contour of her breasts. No itching at her nipples. She had a birads 1 mammogram 10/2023.     The following portions of the patient's history were reviewed and updated as appropriate:current medications and allergies    Social History    Tobacco Use      Smoking status: Never      Smokeless tobacco: Never         Objective   /70 (BP Location: Left arm, Patient Position: Sitting, Cuff Size: Adult)   Ht 160 cm (63\")   Wt 85.7 kg (189 lb)   LMP 2024 (Approximate)   BMI 33.48 kg/m²   Breasts: breasts appear normal, no suspicious masses, no skin or nipple changes or axillary nodes, symmetric fibrous changes in both upper outer quadrants. Dense breast tissue noted bilaterally    Lab Review   No data reviewed    Imaging   No data reviewed        Assessment   Bilateral nipple pain  Due for screening mammogram  Dense breast tissue     Plan   Order for bilateral diagnostic mammogram with ultrasound if indicated   The importance of keeping all planned follow-up and taking all medications as prescribed was emphasized.        No orders of the defined types were placed in this encounter.     "     This note was electronically signed.    Liane Cavanaugh, APRN  July 25, 2024

## 2024-07-25 ENCOUNTER — OFFICE VISIT (OUTPATIENT)
Dept: OBSTETRICS AND GYNECOLOGY | Facility: CLINIC | Age: 54
End: 2024-07-25
Payer: COMMERCIAL

## 2024-07-25 VITALS
BODY MASS INDEX: 33.49 KG/M2 | HEIGHT: 63 IN | DIASTOLIC BLOOD PRESSURE: 70 MMHG | SYSTOLIC BLOOD PRESSURE: 122 MMHG | WEIGHT: 189 LBS

## 2024-07-25 DIAGNOSIS — N64.4 NIPPLE PAIN: Primary | ICD-10-CM

## 2024-07-25 DIAGNOSIS — R92.30 DENSE BREAST TISSUE: ICD-10-CM

## 2024-07-25 PROCEDURE — 99213 OFFICE O/P EST LOW 20 MIN: CPT | Performed by: NURSE PRACTITIONER

## 2024-07-25 RX ORDER — NITROFURANTOIN 25; 75 MG/1; MG/1
100 CAPSULE ORAL
COMMUNITY
Start: 2024-07-22

## 2024-08-02 ENCOUNTER — HOSPITAL ENCOUNTER (OUTPATIENT)
Facility: HOSPITAL | Age: 54
Discharge: HOME | End: 2024-08-02
Payer: COMMERCIAL

## 2024-08-02 DIAGNOSIS — N64.4 NIPPLE PAIN: ICD-10-CM

## 2024-08-02 DIAGNOSIS — R92.30 DENSE BREAST TISSUE: ICD-10-CM

## 2024-08-02 LAB
NCCN CRITERIA FLAG: NORMAL
TYRER CUZICK SCORE: 9.3

## 2024-08-02 PROCEDURE — 76642 ULTRASOUND BREAST LIMITED: CPT

## 2024-08-02 PROCEDURE — G0279 TOMOSYNTHESIS, MAMMO: HCPCS

## 2024-08-02 PROCEDURE — 77066 DX MAMMO INCL CAD BI: CPT

## 2025-07-17 ENCOUNTER — OFFICE VISIT (OUTPATIENT)
Age: 55
End: 2025-07-17
Payer: COMMERCIAL

## 2025-07-17 VITALS
DIASTOLIC BLOOD PRESSURE: 70 MMHG | RESPIRATION RATE: 16 BRPM | SYSTOLIC BLOOD PRESSURE: 118 MMHG | WEIGHT: 187 LBS | BODY MASS INDEX: 33.13 KG/M2

## 2025-07-17 DIAGNOSIS — Z01.419 ENCOUNTER FOR WELL WOMAN EXAM WITH ROUTINE GYNECOLOGICAL EXAM: Primary | ICD-10-CM

## 2025-07-17 DIAGNOSIS — N92.0 MENORRHAGIA WITH REGULAR CYCLE: ICD-10-CM

## 2025-07-17 DIAGNOSIS — N39.3 SUI (STRESS URINARY INCONTINENCE, FEMALE): ICD-10-CM

## 2025-07-17 RX ORDER — ACETAMINOPHEN AND CODEINE PHOSPHATE 120; 12 MG/5ML; MG/5ML
1 SOLUTION ORAL DAILY
Qty: 84 TABLET | Refills: 4 | Status: SHIPPED | OUTPATIENT
Start: 2025-07-17 | End: 2026-07-17

## 2025-07-17 NOTE — PROGRESS NOTES
Subjective   Chief Complaint   Patient presents with    Annual Exam     Tiffanie Brennan is a 55 y.o. year old  presenting to be seen for her annual exam. She is overall feeling well but has some concerns regarding her periods.     SEXUAL Hx:  She is currently sexually active.  In the past year there there has been NO new sexual partners.    Condoms are never used.  She would not like to be screened for STD's at today's exam.  Current birth control method: withdrawal and cycle tracking.  She is happy with her current method of contraception and does not want to discuss alternative methods of contraception.  MENSTRUAL Hx:  Patient's last menstrual period was 2025.  In the past 6 months her cycles have been regular, predictable and occur monthly.  Her menstrual flow is typically moderately heavy.   Each month on average there are roughly 5 day(s) of very heavy flow.  She typically has about 7-8 total days of flow.   Intermenstrual bleeding is present - occasionally.    Post-coital bleeding is absent.  Dysmenorrhea: mild and is not affecting her activities of daily living  PMS: mild and is not affecting her activities of daily living  Her cycles ARE a source of concern for her that she wishes to discuss today.  HEALTH Hx:  She exercises regularly: yes. She recently started increasing activity and is working towards a healthier lifestyle  She wears her seat belt: yes.  She has concerns about domestic violence: no.  OTHER THINGS SHE WANTS TO DISCUSS TODAY:  Nothing else    The following portions of the patient's history were reviewed and updated as appropriate:problem list, current medications, allergies, past family history, past medical history, past social history, and past surgical history.    Social History    Tobacco Use      Smoking status: Never      Smokeless tobacco: Never    Past Medical History:   Diagnosis Date    Anxiety     Depression     Depression/Anxiety    DVT (deep venous thrombosis)  2014    Left leg, on anti coagulation for about 3 months     Easy bruising     Fatigue     History of cervical dysplasia     s/p LEEP, all paps normal since then    Hypersomnia     Menorrhagia      Past Surgical History:   Procedure Laterality Date    APPENDECTOMY OPEN       SECTION  1999     SECTION  2008    CHOLECYSTECTOMY N/A 2021    Procedure: CHOLECYSTECTOMY LAPAROSCOPIC;  Surgeon: Bigg Vilchis MD;  Location: [facility];  Service: General;  Laterality: N/A;    LASIK      ?    LEEP      ~1992    OTHER SURGICAL HISTORY  2013    Follow-up venographyafter thrombolysis. Removal of thrombolysis catheter.    RHINOPLASTY      and tonsils    TRIGGER FINGER RELEASE           Review of Systems   Constitutional: Negative.  Negative for appetite change and diaphoresis.   Respiratory: Negative.     Cardiovascular: Negative.    Gastrointestinal: Negative.  Negative for abdominal distention, blood in stool, GERD and indigestion.   Genitourinary:  Positive for menstrual problem and urinary incontinence. Negative for breast discharge, breast lump, breast pain, dysuria and hematuria.   Skin: Negative.           Objective   /70   Resp 16   Wt 84.8 kg (187 lb)   LMP 2025   BMI 33.13 kg/m²     Physical Exam  Vitals and nursing note reviewed. Exam conducted with a chaperone present.   Constitutional:       Appearance: Normal appearance.   Cardiovascular:      Rate and Rhythm: Normal rate and regular rhythm.      Heart sounds: Normal heart sounds.   Pulmonary:      Breath sounds: Normal breath sounds.   Chest:   Breasts:     Right: Normal.      Left: Normal.   Genitourinary:     General: Normal vulva.      Exam position: Lithotomy position.      Vagina: Normal.      Cervix: Normal.      Uterus: Normal.       Adnexa: Right adnexa normal and left adnexa normal.      Rectum: Normal.      Comments: Digital rectal exam not done but appears normal  visually  Neurological:      Mental Status: She is alert and oriented to person, place, and time.   Psychiatric:         Mood and Affect: Mood normal.         Behavior: Behavior normal.         Thought Content: Thought content normal.         Judgment: Judgment normal.            Diagnoses and all orders for this visit:    1. Encounter for well woman exam with routine gynecological exam (Primary)    2. REI (stress urinary incontinence, female)    3. Menorrhagia with regular cycle    Other orders  -     norethindrone (MICRONOR) 0.35 MG tablet; Take 1 tablet by mouth Daily.  Dispense: 84 tablet; Refill: 4      Pap was not done today.  I explained to Tiffanie that the recommendations for Pap smear interval in a low risk patient has lengthened to 3 years time.  I told Tiffanie she still needs to be seen in our office yearly for a full physical including breast and pelvic exam.    She is up to date on all relevant gynecologic and colorectal screenings except colon cancer screening- she has Cologuard kit at home and has been waiting until she did not have heavy menstrual bleeding.     Discussed heavy menstrual bleeding- advised this is potentially normal with menopausal changes but can be a sign of hyperplasia. She has had TVUS and what sounds like hysteroscopy in the past with a negative workup- she is a little hesitant to go that route again. Discussed trialing a POP to see if that helps with heavy bleeding. She understands she is to call or reach out if things do not improve within 2-3 months and we will likely get u/s and go from there. Additionally offered LNG IUD as well as TXA, but she ultimately liked the idea of POP.       Start OCP's.  A new prescription(s) was created today    The importance of keeping all planned follow-up and taking all medications as prescribed was emphasized.    Today I discussed with Tiffanie the total recommended calcium intake for a premenopausal female is 1000 mg.  Ideally this should be from  dietary sources.  I reviewed calcium content in various foods including milk, fortified orange juice and yogurt.  If she cannot get sufficient calcium through dietary means, it is recommended to supplement with either a multivitamin or calcium to reach her daily goal.  I also reviewed the difference in the bioavailability of calcium carbonate and calcium citrate containing supplements and the importance of taking calcium carbonate containing products with food.  Finally, vitamin D's role in calcium absorption was reviewed and a total daily vitamin D intake of 800 units was recommended.    I discussed with Tiffanie that she may be behind on needed vaccinations for vaccines are up to date.  She may be able to obtain these vaccinations at her local pharmacy OR speak about obtaining them with her primary care.  If she does obtain her vaccines, I have asked Tiffanie to let us know the date each vaccine was obtained so that her medical record could be updated in our system.    New Medications Ordered This Visit   Medications    norethindrone (MICRONOR) 0.35 MG tablet     Sig: Take 1 tablet by mouth Daily.     Dispense:  84 tablet     Refill:  4            Return in about 1 year (around 7/17/2026) for Annual physical.    Meche Desouza, APRN  July 17, 2025

## (undated) DEVICE — [HIGH FLOW INSUFFLATOR,  DO NOT USE IF PACKAGE IS DAMAGED,  KEEP DRY,  KEEP AWAY FROM SUNLIGHT,  PROTECT FROM HEAT AND RADIOACTIVE SOURCES.]: Brand: PNEUMOSURE

## (undated) DEVICE — APPL CHLORAPREP W/TINT 26ML BLU

## (undated) DEVICE — CVR HNDL LIGHT RIGID

## (undated) DEVICE — SUT SILK 2/0 TIES 18IN A185H

## (undated) DEVICE — GLV SURG GRN DERMASSURE LF PF 7.5

## (undated) DEVICE — ADHS LIQ MASTISOL 2/3ML

## (undated) DEVICE — ENDOPATH XCEL BLADELESS TROCARS WITH STABILITY SLEEVES: Brand: ENDOPATH XCEL

## (undated) DEVICE — ENDOPATH XCEL UNIVERSAL TROCAR STABLILITY SLEEVES: Brand: ENDOPATH XCEL

## (undated) DEVICE — SUT VIC 0 UR6 27IN VCP603H

## (undated) DEVICE — ENDOPOUCH RETRIEVER SPECIMEN RETRIEVAL BAGS: Brand: ENDOPOUCH RETRIEVER

## (undated) DEVICE — PK LAP LASR CHOLE 10

## (undated) DEVICE — MINI ENDOCUT SCISSOR TIP, DISPOSABLE: Brand: RENEW

## (undated) DEVICE — SUT MNCRYL PLS ANTIB UD 4/0 PS2 18IN

## (undated) DEVICE — GLV SURG BIOGEL LTX PF 7

## (undated) DEVICE — VISUALIZATION SYSTEM: Brand: CLEARIFY

## (undated) DEVICE — ENDOPATH XCEL BLUNT TIP TROCARS WITH SMOOTH SLEEVES: Brand: ENDOPATH XCEL